# Patient Record
Sex: MALE | Race: WHITE | NOT HISPANIC OR LATINO | Employment: FULL TIME | URBAN - METROPOLITAN AREA
[De-identification: names, ages, dates, MRNs, and addresses within clinical notes are randomized per-mention and may not be internally consistent; named-entity substitution may affect disease eponyms.]

---

## 2017-09-01 ENCOUNTER — OFFICE VISIT (OUTPATIENT)
Dept: URGENT CARE | Facility: URGENT CARE | Age: 52
End: 2017-09-01
Payer: COMMERCIAL

## 2017-09-01 VITALS
TEMPERATURE: 98 F | DIASTOLIC BLOOD PRESSURE: 90 MMHG | HEART RATE: 78 BPM | RESPIRATION RATE: 15 BRPM | OXYGEN SATURATION: 97 % | SYSTOLIC BLOOD PRESSURE: 124 MMHG | WEIGHT: 220 LBS | HEIGHT: 76 IN | BODY MASS INDEX: 26.79 KG/M2

## 2017-09-01 DIAGNOSIS — I83.90 VARICOSE VEIN OF LEG: Primary | ICD-10-CM

## 2017-09-01 PROCEDURE — 99213 OFFICE O/P EST LOW 20 MIN: CPT | Performed by: FAMILY MEDICINE

## 2017-09-01 NOTE — MR AVS SNAPSHOT
"              After Visit Summary   2017    Gualberto Smith    MRN: [id number]           Patient Information     Date Of Birth          1965        Visit Information        Provider Department      2017 5:15 PM Andre Crawford MD [facility]        Today's Diagnoses     Varicose vein of leg    -  1       Follow-ups after your visit        Who to contact     If you have questions or need follow up information about today's clinic visit or your schedule please contact [facility] U[facility] directly at [phone].  Normal or non-critical lab and imaging results will be communicated to you by [employer], letter or phone within 4 business days after the clinic has received the results. If you do not hear from us within 7 days, please contact the clinic through [employer] or phone. If you have a critical or abnormal lab result, we will notify you by phone as soon as possible.  Submit refill requests through [employer] or call your pharmacy and they will forward the refill request to us. Please allow 3 business days for your refill to be completed.          Additional Information About Your Visit        MyChart Information     [employer] lets you send messages to your doctor, view your test results, renew your prescriptions, schedule appointments and more. To sign up, go to [email or web address] . Click on \"Log in\" on the left side of the screen, which will take you to the Welcome page. Then click on \"Sign up Now\" on the right side of the page.     You will be asked to enter the access code listed below, as well as some personal information. Please follow the directions to create your username and password.     Your access code is: [email or web address]  Expires: 2017  5:32 PM     Your access code will  in 90 days. If you need help or a new code, please call your [facility] clinic or [phone].        Care EveryWhere ID     This is your Care EveryWhere ID. This could be used " "by other organizations to access your [facility] medical records  [id number]        Your Vitals Were     Pulse Temperature Respirations Height Pulse Oximetry BMI (Body Mass Index)    78 98  F (36.7  C) (Oral) 15 6' 4\" (1.93 m) 97% 26.78 kg/m2       Blood Pressure from Last 3 Encounters:   09/01/17 124/90   08/12/16 112/66   07/23/15 (!) 135/96    Weight from Last 3 Encounters:   09/01/17 220 lb (99.8 kg)   08/12/16 218 lb 4 oz (99 kg)   07/23/15 209 lb 1.6 oz (94.8 kg)              Today, you had the following     No orders found for display         Today's Medication Changes          These changes are accurate as of: 9/1/17  5:32 PM.  If you have any questions, ask your nurse or doctor.               Stop taking these medicines if you haven't already. Please contact your care team if you have questions.     azithromycin 250 MG tablet   Commonly known as:  ZITHROMAX   Stopped by:  Andre Crawford MD           guaiFENesin-codeine 100-10 MG/5ML Soln solution   Commonly known as:  ROBITUSSIN AC   Stopped by:  Andre Crawford MD                    Primary Care Provider Office Phone # Fax #    Arash Salgado René Ness [phone] [address] [facility] [address]        Equal Access to Services     [facility] ENRRIQUE AH: Hadii miesha ku hadasho Soomaali, waaxda luqadaha, qaybta kaalmada wendie rangel. So [facility] [phone].    ATENCIÓN: Si habla español, tiene a doss disposición servicios gratuitos de asistencia lingüística. Jeanie al [phone].    We comply with applicable federal civil rights laws and [place] laws. We do not discriminate on the basis of race, color, national origin, age, disability sex, sexual orientation or gender identity.            Thank you!     Thank you for choosing [facility] URGENT CARE  for your care. Our goal is always to provide you with excellent care. Hearing back from our patients is one way we can continue to " improve our services. Please take a few minutes to complete the written survey that you may receive in the mail after your visit with us. Thank you!             Your Updated Medication List - Protect others around you: Learn how to safely use, store and throw away your medicines at www.disposemymeds.org.          This list is accurate as of: 9/1/17  5:32 PM.  Always use your most recent med list.                   Brand Name Dispense Instructions for use Diagnosis    acetaminophen 650 MG 8 hour tablet     100 tablet    Take 650 mg by mouth every 4 hours as needed for mild pain    Acute pharyngitis       acyclovir 400 MG tablet    ZOVIRAX    15 tablet    Take 1 tablet (400 mg) by mouth 3 times daily    Recurrent cold sores       albuterol 108 (90 BASE) MCG/ACT Inhaler    PROAIR HFA/PROVENTIL HFA/VENTOLIN HFA    1 Inhaler    Inhale 2 puffs into the lungs every 6 hours as needed for shortness of breath / dyspnea or wheezing    Acute bronchitis       Multi-vitamin Tabs tablet     100 tablet    Take 1 tablet by mouth daily        phenol-menthol 14.5 MG lozenge      Place 1 lozenge inside cheek every 2 hours as needed for moderate pain        VIAGRA 100 MG cap/tab   Generic drug:  sildenafil     12 tablet    Take 1 tablet (100 mg) by mouth daily as needed for erectile dysfunction HOLD UNTIL YOU ARE SEEN BY YOUR PRIMARY DOCTOR.    ED (erectile dysfunction)

## 2017-09-01 NOTE — PROGRESS NOTES
Subjective: Patient has had varicose veins in his legs for a long time and occasionally one will get a little sore and then pop out and it feels like that may be happening in his right upper calf posteriorly but he has been on a plane a few times in the last few weeks and got worried. He has never had deep venous thrombosis.    Objective: He has a 2 x 4 cm area that is just slightly swollen and slightly red but quite near the surface, no obvious skin abnormalities, not in apparent typical for a vein. He has lots of typical varicose veins through both sides of the legs.    Assessment and plan: Superficial irritation that may be related to varicose veins but certainly isn't a DVT because it is so close to the surface. He'll hot pack it and observe for a while and if it gets worse he should be seen in the emergency room since we couldn't do an ultrasound here.

## 2017-09-01 NOTE — NURSING NOTE
"Chief Complaint   Patient presents with     Urgent Care     Pain     right calf soreness but pain is at surface, some redness. Pt has been flying a lot lately. has had this before.        Initial /90  Pulse 78  Temp 98  F (36.7  C) (Oral)  Resp 15  Ht 6' 4\" (1.93 m)  Wt 220 lb (99.8 kg)  SpO2 97%  BMI 26.78 kg/m2 Estimated body mass index is 26.78 kg/(m^2) as calculated from the following:    Height as of this encounter: 6' 4\" (1.93 m).    Weight as of this encounter: 220 lb (99.8 kg).  Medication Reconciliation: complete  "

## 2017-09-05 ENCOUNTER — TELEPHONE (OUTPATIENT)
Dept: FAMILY MEDICINE | Facility: CLINIC | Age: 52
End: 2017-09-05

## 2017-09-05 DIAGNOSIS — M79.661 RIGHT CALF PAIN: Primary | ICD-10-CM

## 2017-09-05 NOTE — TELEPHONE ENCOUNTER
Spoke with patient and reviewed normal ultrasound results.    Arash Ness MD  Family Medicine Physician

## 2017-09-05 NOTE — TELEPHONE ENCOUNTER
Pretty, Technologist from [facility],who performed US stated patient results for DVT negative. Patient does have a tiny thrombus in a superficial varicose vein that seems to be causing the patient redness and discomfort at the noted area. Patient sent home.  If any questions the number to speak to Technologist [phone].    Please advise on any instruction for the patient .     Thanks! Bell Campbell RN

## 2017-09-05 NOTE — TELEPHONE ENCOUNTER
LVM instructing patient to call clinic back to inform which leg patient is having pain so US can be ordered.     Thanks! Bell Campbell RN

## 2017-09-05 NOTE — TELEPHONE ENCOUNTER
Rt leg pain with visibly red about a quarter size on calf. Patient requesting US for right calf. Arash Ness MD in agreement per note. Order placed and patient updated to appt. At [address] [place] [address] at 2:30 pm. Read and call requested so Arash Ness MD can advise patient.     Thanks! Bell Campbell RN

## 2017-09-05 NOTE — TELEPHONE ENCOUNTER
[facility] note looks like they aren't concerned about DVT, but rather superficial phlebitis. Not clear which leg. I am happy to see him to discuss. If he really feels nothing has changed since last visit, could order outpatient ultrasound, but should be done by early afternoon so results can be discussed/called to me. Would need to clarify if it is of one or both legs.     Alternatively could refer him to vein clinic/vascular if this is more of an ongoing issue.    Arash Ness MD  Family Medicine Physician

## 2017-09-05 NOTE — TELEPHONE ENCOUNTER
Reason for Call: Request for an order or referral:    Order or referral being requested: US of leg    Date needed: as soon as possible    Has the patient been seen by the PCP for this problem? NO    Additional comments: Pt was seen at  on 9/1. Pt still has the same symptoms and is requesting an US be ordered.    Phone number Patient can be reached at: [phone]    Best Time:  Anytime    Can we leave a detailed message on this number?  YES    Call taken on 9/5/2017 at 9:53 AM by Sondra Comer

## 2017-11-03 ENCOUNTER — TELEPHONE (OUTPATIENT)
Dept: FAMILY MEDICINE | Facility: CLINIC | Age: 52
End: 2017-11-03

## 2017-11-07 ENCOUNTER — TRANSFERRED RECORDS (OUTPATIENT)
Dept: HEALTH INFORMATION MANAGEMENT | Facility: CLINIC | Age: 52
End: 2017-11-07

## 2018-01-05 ENCOUNTER — TELEPHONE (OUTPATIENT)
Dept: FAMILY MEDICINE | Facility: CLINIC | Age: 53
End: 2018-01-05

## 2018-01-05 NOTE — TELEPHONE ENCOUNTER
Reason for Call: Request for an order or referral:    Order or referral being requested: ORDER    Date needed: as soon as possible    Has the patient been seen by the PCP for this problem? NO    Additional comments: Pt had imaging done at [facility] who told pt he had a hernia. Pt needs Order from Dr René Ness to see a surgeon or Physical Therapist.      Phone number Patient can be reached at:  Home number on file [phone] (home)    Best Time:  Any time    Can we leave a detailed message on this number?  YES    Call taken on 1/5/2018 at 11:05 AM by Mali Veliz

## 2018-01-05 NOTE — TELEPHONE ENCOUNTER
Attempted to call pt but the mailbox is full.   No my chart  Pt hasn't had an office visit since 8/16  Nelly Tolbert RN

## 2018-01-05 NOTE — TELEPHONE ENCOUNTER
Unclear what type of hernia he is referring to. Herniated disc? Abdominal or inguinal hernia? Insurance rarely requires referral but could offer number perhaps with dr order pending clarification. May be helpful to request records from [facility] [facility].    Arash Ness MD  Family Medicine Physician

## 2018-01-09 NOTE — TELEPHONE ENCOUNTER
Gualberto called back saying he will have the [facility] fax over the imaging results, he said there was 3 spots.

## 2018-01-23 NOTE — TELEPHONE ENCOUNTER
SOREN do you remember seeing a fax from [facility] regarding this patient?  The message is from 2 weeks ago.     I guess we can close encounter if we have not heard as your note says below. Thought I would check with you first.   Venita Grewal RN

## 2018-01-24 NOTE — TELEPHONE ENCOUNTER
No I have not yet received a fax from [facility] about this.    Arash Ness MD  Family Medicine Physician

## 2018-03-02 ENCOUNTER — OFFICE VISIT (OUTPATIENT)
Dept: FAMILY MEDICINE | Facility: CLINIC | Age: 53
End: 2018-03-02
Payer: COMMERCIAL

## 2018-03-02 VITALS
HEIGHT: 76 IN | BODY MASS INDEX: 27.38 KG/M2 | WEIGHT: 224.8 LBS | HEART RATE: 67 BPM | OXYGEN SATURATION: 95 % | DIASTOLIC BLOOD PRESSURE: 86 MMHG | SYSTOLIC BLOOD PRESSURE: 134 MMHG

## 2018-03-02 DIAGNOSIS — S39.012A STRAIN OF LUMBAR REGION, INITIAL ENCOUNTER: ICD-10-CM

## 2018-03-02 DIAGNOSIS — M62.08 DIASTASIS RECTI: Primary | ICD-10-CM

## 2018-03-02 DIAGNOSIS — Z11.59 NEED FOR HEPATITIS C SCREENING TEST: ICD-10-CM

## 2018-03-02 DIAGNOSIS — Z23 NEED FOR PROPHYLACTIC VACCINATION WITH TETANUS-DIPHTHERIA (TD): ICD-10-CM

## 2018-03-02 DIAGNOSIS — K42.9 UMBILICAL HERNIA WITHOUT OBSTRUCTION AND WITHOUT GANGRENE: ICD-10-CM

## 2018-03-02 DIAGNOSIS — Z23 IMMUNIZATION DUE: ICD-10-CM

## 2018-03-02 DIAGNOSIS — Z12.11 SCREEN FOR COLON CANCER: ICD-10-CM

## 2018-03-02 DIAGNOSIS — B00.1 RECURRENT COLD SORES: ICD-10-CM

## 2018-03-02 PROCEDURE — 99214 OFFICE O/P EST MOD 30 MIN: CPT | Mod: 25 | Performed by: FAMILY MEDICINE

## 2018-03-02 PROCEDURE — 90715 TDAP VACCINE 7 YRS/> IM: CPT | Performed by: FAMILY MEDICINE

## 2018-03-02 PROCEDURE — 90471 IMMUNIZATION ADMIN: CPT | Performed by: FAMILY MEDICINE

## 2018-03-02 RX ORDER — ACYCLOVIR 400 MG/1
400 TABLET ORAL 3 TIMES DAILY
Qty: 15 TABLET | Refills: 3 | Status: SHIPPED | OUTPATIENT
Start: 2018-03-02 | End: 2019-05-13

## 2018-03-02 NOTE — NURSING NOTE
"Chief Complaint   Patient presents with     RECHECK       Initial /86 (BP Location: Left arm, Patient Position: Chair, Cuff Size: Adult Regular)  Pulse 67  Ht 6' 4\" (1.93 m)  Wt 224 lb 12.8 oz (102 kg)  SpO2 95%  BMI 27.36 kg/m2 Estimated body mass index is 27.36 kg/(m^2) as calculated from the following:    Height as of this encounter: 6' 4\" (1.93 m).    Weight as of this encounter: 224 lb 12.8 oz (102 kg).  Medication Reconciliation: complete     Martha Reeves MA     "

## 2018-03-02 NOTE — MR AVS SNAPSHOT
After Visit Summary   3/2/2018    Gualberto Smith    MRN: [id number]           Patient Information     Date Of Birth          1965        Visit Information        Provider Department      3/2/2018 8:30 AM Arash Calvo MD [facility]        Today's Diagnoses     Diastasis recti    -  1    Screen for colon cancer        Need for hepatitis C screening test        Need for prophylactic vaccination with tetanus-diphtheria (TD)        Recurrent cold sores        Umbilical hernia without obstruction and without gangrene        Strain of lumbar region, initial encounter          Care Instructions    Will benefit from core strengthening.  I am recommending a consult with general surgery to discuss questions about hernia that were raised at your executive physical.  Unclear if there may be a partial inguinal hernia as well.           Follow-ups after your visit        Additional Services     GENERAL SURG ADULT REFERRAL       Your provider has referred you to: [facility]: General Surgery Clinic [facility] [phone]   [email or web address]    Unclear if there may be a partial inguinal hernia as well. Patient looking for information as to if and when surgery would be beneficial but is not eager to have surgical repair, especially if it would not help.    Please be aware that coverage of these services is subject to the terms and limitations of your health insurance plan.  Call member services at your health plan with any benefit or coverage questions.      Please bring the following with you to your appointment:    (1) Any X-Rays, CTs or MRIs which have been performed.  Contact the facility where they were done to arrange for  prior to your scheduled appointment.   (2) List of current medications   (3) This referral request   (4) Any documents/labs given to you for this referral            [facility] PT, HAND, AND CHIROPRACTIC REFERRAL        **This order will print in the [facility] Scheduling Office**    Physical Therapy, Hand Therapy and Chiropractic Care are available through:    *[facility]  *[facility]  *[facility]    Call one number to schedule at any of the above locations: [phone].    Your provider has referred you to: Physical Therapy at [facility] or [facility]    Indication/Reason for Referral: Low Back Pain  Onset of Illness: ongoing  Therapy Orders: Evaluate and Treat  Special Programs: None  Special Request: Equipment: As Indicated: per eval  Exercise: Active/Assistive ROM, Conditioning, Passive ROM, Progressive Strengthening and Stretching/Flexibility  Manual Therapy: Joint Mobilization and Myofascial Release/Massage  None    Candy Gupta      Additional Comments for the Therapist or Chiropractor: Patient will benefit from hamstring stretching, iliopsoas stretching and strengthening of these and other core back muscles.    Please be aware that coverage of these services is subject to the terms and limitations of your health insurance plan.  Call member services at your health plan with any benefit or coverage questions.      Please bring the following to your appointment:    *Your personal calendar for scheduling future appointments  *Comfortable clothing                  Who to contact     If you have questions or need follow up information about today's clinic visit or your schedule please contact [facility] directly at [phone].  Normal or non-critical lab and imaging results will be communicated to you by MyChart, letter or phone within 4 business days after the clinic has received the results. If you do not hear from us within 7 days, please contact the clinic through MyChart or phone. If you have a critical or abnormal lab result, we will notify you by phone as soon as possible.  Submit refill requests through [employer] or call your pharmacy and they will forward  "the refill request to us. Please allow 3 business days for your refill to be completed.          Additional Information About Your Visit        MyChart Information     [employer] lets you send messages to your doctor, view your test results, renew your prescriptions, schedule appointments and more. To sign up, go to [email or web address] . Click on \"Log in\" on the left side of the screen, which will take you to the Welcome page. Then click on \"Sign up Now\" on the right side of the page.     You will be asked to enter the access code listed below, as well as some personal information. Please follow the directions to create your username and password.     Your access code is: [email or web address]  Expires: 2018  9:10 AM     Your access code will  in 90 days. If you need help or a new code, please call your [facility] clinic or [phone].        Care EveryWhere ID     This is your Care EveryWhere ID. This could be used by other organizations to access your [facility] medical records  [id number]        Your Vitals Were     Pulse Height Pulse Oximetry BMI (Body Mass Index)          67 6' 4\" (1.93 m) 95% 27.36 kg/m2         Blood Pressure from Last 3 Encounters:   18 134/86   17 124/90   16 112/66    Weight from Last 3 Encounters:   18 224 lb 12.8 oz (102 kg)   17 220 lb (99.8 kg)   16 218 lb 4 oz (99 kg)              We Performed the Following     GENERAL SURG ADULT REFERRAL     [facility] PT, HAND, AND CHIROPRACTIC REFERRAL          Where to get your medicines      These medications were sent to [id number] - [place], [place] - [address] AT [facility] [address] [place] [address]    Hours:  24-hours Phone:  [phone]     acyclovir 400 MG tablet          Primary Care Provider Office Phone # Fax #    Arash Damian Ness [phone]       [address]  [facility] [address]        Equal Access to Services     " [facility] HospitalMICHAEL : Hadii aad ku clement Del Cid, waaxda luqadaha, qaybta kaalmada adeyomaira, wendie levi kobyernestina wilhelm oswaldoale piedra . So [facility] [phone].    ATENCIÓN: Si habla español, tiene a doss disposición servicios gratuitos de asistencia lingüística. Llame al [phone].    We comply with applicable federal civil rights laws and [place] laws. We do not discriminate on the basis of race, color, national origin, age, disability, sex, sexual orientation, or gender identity.            Thank you!     Thank you for choosing [facility]  for your care. Our goal is always to provide you with excellent care. Hearing back from our patients is one way we can continue to improve our services. Please take a few minutes to complete the written survey that you may receive in the mail after your visit with us. Thank you!             Your Updated Medication List - Protect others around you: Learn how to safely use, store and throw away your medicines at www.disposemymeds.org.          This list is accurate as of 3/2/18  9:10 AM.  Always use your most recent med list.                   Brand Name Dispense Instructions for use Diagnosis    acetaminophen 650 MG 8 hour tablet     100 tablet    Take 650 mg by mouth every 4 hours as needed for mild pain    Acute pharyngitis       acyclovir 400 MG tablet    ZOVIRAX    15 tablet    Take 1 tablet (400 mg) by mouth 3 times daily    Recurrent cold sores       albuterol 108 (90 BASE) MCG/ACT Inhaler    PROAIR HFA/PROVENTIL HFA/VENTOLIN HFA    1 Inhaler    Inhale 2 puffs into the lungs every 6 hours as needed for shortness of breath / dyspnea or wheezing    Acute bronchitis       Multi-vitamin Tabs tablet     100 tablet    Take 1 tablet by mouth daily        VIAGRA 100 MG tablet   Generic drug:  sildenafil     12 tablet    Take 1 tablet (100 mg) by mouth daily as needed for erectile dysfunction HOLD UNTIL YOU ARE SEEN BY YOUR PRIMARY DOCTOR.    ED (erectile  dysfunction)

## 2018-03-02 NOTE — PROGRESS NOTES
"  SUBJECTIVE:   Gualberto Smith is a 52 year old male who presents to clinic today for the following health issues:    Recheck : From 11/2017  - MRI at [facility]     He had an executive physical exam last fall at [facility]. They noted a hernia on ultrasound and he wanted to follow-up on this. He hasn't yet had a basic consult on whether or not he needs surgery, discussion of what the perioperative course would entail and how to prepare. If he does need surgery he wants to be sure he does what he can to strengthen his back prior to surgery.    He noticed having a hernia last spring.    ROS  Does have pain in between the shoulder blades  Does have some low back discomfort  No diarrhea  No constipation  Some intermittent abdominal pain in both inguinal areas.    Would like a refill for acyclovir for rare flare ups of HSV lesion on the lower lip.    Had colonoscopy last spring at [place] [facility] that was clear.    EXAM:  /86 (BP Location: Left arm, Patient Position: Chair, Cuff Size: Adult Regular)  Pulse 67  Ht 6' 4\" (1.93 m)  Wt 224 lb 12.8 oz (102 kg)  SpO2 95%  BMI 27.36 kg/m2  Constitutional: Healthy, alert, no distress   EENT:   Cardiovascular: RRR. No murmurs   Respiratory: Clear to auscultation   Gastrointestinal: Diastesis recti noted, minimal umbilical hernia, no discomfort with palpation of abdomen  MSK; ambulates without difficulty, some difficulty sitting from a prone position without using arms.    ASSESSMENT    ICD-10-CM    1. Diastasis recti M62.08 GENERAL SURG ADULT REFERRAL   2. Umbilical hernia without obstruction and without gangrene K42.9 GENERAL SURG ADULT REFERRAL   3. Strain of lumbar region, initial encounter S39.012A [facility] PT, HAND, AND CHIROPRACTIC REFERRAL   4. Screen for colon cancer Z12.11    5. Need for hepatitis C screening test Z11.59    6. Need for prophylactic vaccination with tetanus-diphtheria (TD) Z23    7. Recurrent cold sores B00.1 acyclovir (ZOVIRAX) 400 MG tablet   8. Immunization due Z23 " TDAP VACCINE (ADACEL)     ADMIN 1st VACCINE      Plan:  Patient Instructions   Will benefit from core strengthening.  I am recommending a consult with general surgery to discuss questions about hernia that were raised at your executive physical.  Unclear if there may be a partial inguinal hernia as well.      Arash Ness MD  Family Medicine Physician

## 2018-03-02 NOTE — PATIENT INSTRUCTIONS
Will benefit from core strengthening.  I am recommending a consult with general surgery to discuss questions about hernia that were raised at your executive physical.  Unclear if there may be a partial inguinal hernia as well.

## 2018-04-16 ENCOUNTER — TRANSFERRED RECORDS (OUTPATIENT)
Dept: HEALTH INFORMATION MANAGEMENT | Facility: CLINIC | Age: 53
End: 2018-04-16

## 2019-05-13 DIAGNOSIS — B00.1 RECURRENT COLD SORES: ICD-10-CM

## 2019-05-13 NOTE — LETTER
[facility]  [address] [place] [address]  Phone: [phone]    05/15/19    Gualberto Smith  [address]  [place] [address]      To whom it may concern:     In order to ensure we are providing the best quality care, we have reviewed your chart and see that you are due for a annual physical.    For future medication refills, please contact your primary care clinic to schedule a annual physical appointment. This can be requested via [employer] or by calling the clinic at [phone].    We greatly appreciate the opportunity to serve you. Thank you for trusting us with your health care.      Sincerely,      Your care team at [facility]

## 2019-05-13 NOTE — TELEPHONE ENCOUNTER
"Requested Prescriptions   Pending Prescriptions Disp Refills     acyclovir (ZOVIRAX) 400 MG tablet [Pharmacy Med Name: ACYCLOVIR 400MG TABLETS]  Last Written Prescription Date:  3-2-18  Last Fill Quantity: 15 tab,  # refills: 3   Last office visit: 3/2/2018 with prescribing provider:  Claudia Cornell    Future Office Visit:    15 tablet 0     Sig: TAKE 1 TABLET BY MOUTH THREE TIMES DAILY       Antivirals for Herpes Protocol Failed - 5/13/2019  7:50 AM        Failed - Recent (12 mo) or future (30 days) visit within the authorizing provider's specialty     Patient had office visit in the last 12 months or has a visit in the next 30 days with authorizing provider or within the authorizing provider's specialty.  See \"Patient Info\" tab in inbasket, or \"Choose Columns\" in Meds & Orders section of the refill encounter.          Failed - Normal serum creatinine on file in past 12 months     Recent Labs   Lab Test 07/23/15  0205   CR 0.87           Passed - Patient is age 12 or older        Passed - Medication is active on med list         "

## 2019-05-15 RX ORDER — ACYCLOVIR 400 MG/1
TABLET ORAL
Qty: 15 TABLET | Refills: 0 | Status: SHIPPED | OUTPATIENT
Start: 2019-05-15 | End: 2019-10-16

## 2019-05-15 NOTE — TELEPHONE ENCOUNTER
Unable to LM as mailbox is full. Patient is due for annual physical.  Sending letter.       Sameera DORAN     [facility]

## 2019-05-15 NOTE — TELEPHONE ENCOUNTER
DOD:  Please review/sign or advise for refill request of: acyclovir (ZOVIRAX) 400 MG tablet     Routing refill request to provider for review/approval because:  Labs not current:  Creat (done 07/2015)  Patient needs to be seen because it has been more than 1 year since last office visit.    HP Reception: Please call patient, he is due for annual exam    Thank You!  Linda Bridges, RN  Triage Nurse

## 2019-10-16 DIAGNOSIS — B00.1 RECURRENT COLD SORES: ICD-10-CM

## 2019-10-17 NOTE — TELEPHONE ENCOUNTER
"Requested Prescriptions   Pending Prescriptions Disp Refills     acyclovir (ZOVIRAX) 400 MG tablet [Pharmacy Med Name: ACYCLOVIR 400MG TABLETS]  Last Written Prescription Date:  5-15-19  Last Fill Quantity: 15 tab,  # refills: 0   Last office visit: 3/2/2018 with prescribing provider:  Smooth Calvo    Future Office Visit:   15 tablet 0     Sig: TAKE 1 TABLET BY MOUTH THREE TIMES DAILY       Antivirals for Herpes Protocol Failed - 10/16/2019  9:05 PM        Failed - Recent (12 mo) or future (30 days) visit within the authorizing provider's specialty     Patient has had an office visit with the authorizing provider or a provider within the authorizing providers department within the previous 12 mos or has a future within next 30 days. See \"Patient Info\" tab in inbasket, or \"Choose Columns\" in Meds & Orders section of the refill encounter.              Failed - Normal serum creatinine on file in past 12 months     Recent Labs   Lab Test 07/23/15  0205   CR 0.87             Passed - Patient is age 12 or older        Passed - Medication is active on med list         "

## 2019-10-18 RX ORDER — ACYCLOVIR 400 MG/1
TABLET ORAL
Qty: 6 TABLET | Refills: 0 | Status: SHIPPED | OUTPATIENT
Start: 2019-10-18 | End: 2021-05-21

## 2019-11-24 ENCOUNTER — OFFICE VISIT (OUTPATIENT)
Dept: URGENT CARE | Facility: URGENT CARE | Age: 54
End: 2019-11-24
Payer: COMMERCIAL

## 2019-11-24 VITALS
WEIGHT: 225 LBS | HEART RATE: 74 BPM | SYSTOLIC BLOOD PRESSURE: 108 MMHG | RESPIRATION RATE: 15 BRPM | BODY MASS INDEX: 27.4 KG/M2 | TEMPERATURE: 98.1 F | HEIGHT: 76 IN | DIASTOLIC BLOOD PRESSURE: 80 MMHG

## 2019-11-24 DIAGNOSIS — J06.9 VIRAL URI WITH COUGH: Primary | ICD-10-CM

## 2019-11-24 PROCEDURE — 99213 OFFICE O/P EST LOW 20 MIN: CPT | Performed by: PHYSICIAN ASSISTANT

## 2019-11-24 RX ORDER — CODEINE PHOSPHATE AND GUAIFENESIN 10; 100 MG/5ML; MG/5ML
1-2 SOLUTION ORAL EVERY 4 HOURS PRN
Qty: 120 ML | Refills: 0 | Status: SHIPPED | OUTPATIENT
Start: 2019-11-24 | End: 2022-03-11

## 2019-11-24 ASSESSMENT — MIFFLIN-ST. JEOR: SCORE: 1957.09

## 2019-11-24 NOTE — PROGRESS NOTES
"SUBJECTIVE:   Gualberto Smith is a 54 year old male presenting with a chief complaint of cough and runny.  Onset of symptoms was 3 day(s) ago.  Course of illness is same.    Severity moderately severe  Current and Associated symptoms: cough and chest congestion  Treatment measures tried include OTC Cough med.  Predisposing factors include Patient was on a flight from [place].  Denies fever/chills, HA, CP, pleuritic chest pain, hemoptysis, SOB, abd pain, N/V/D, rash, or any other symptoms.    Past Medical History:   Diagnosis Date     NO ACTIVE PROBLEMS      Current Outpatient Medications   Medication Sig Dispense Refill     acetaminophen 650 MG TABS Take 650 mg by mouth every 4 hours as needed for mild pain 100 tablet      acyclovir (ZOVIRAX) 400 MG tablet TAKE 1 TABLET BY MOUTH THREE TIMES DAILY 6 tablet 0     albuterol (PROAIR HFA, PROVENTIL HFA, VENTOLIN HFA) 108 (90 BASE) MCG/ACT inhaler Inhale 2 puffs into the lungs every 6 hours as needed for shortness of breath / dyspnea or wheezing 1 Inhaler 0     multivitamin, therapeutic with minerals (MULTI-VITAMIN) TABS Take 1 tablet by mouth daily 100 tablet 3     sildenafil (VIAGRA) 100 MG tablet Take 1 tablet (100 mg) by mouth daily as needed for erectile dysfunction HOLD UNTIL YOU ARE SEEN BY YOUR PRIMARY DOCTOR. 12 tablet 3     Social History     Tobacco Use     Smoking status: Never Smoker     Smokeless tobacco: Never Used   Substance Use Topics     Alcohol use: Yes     Comment: 3 times per week       ROS:  Review of systems negative except as stated above.    OBJECTIVE:  /80   Pulse 74   Temp 98.1  F (36.7  C) (Oral)   Resp 15   Ht 1.93 m (6' 4\")   Wt 102.1 kg (225 lb)   BMI 27.39 kg/m    GENERAL APPEARANCE: healthy, alert and no distress  EYES: EOMI,  PERRL, conjunctiva clear  HENT: ear canals and TM's normal.  Nose and mouth without ulcers, erythema or lesions  NECK: supple, nontender, no lymphadenopathy  RESP: lungs clear to auscultation - no rales, " rhonchi or wheezes  CV: regular rates and rhythm, normal S1 S2, no murmur noted  Extremities: Lakshmi's sign is negative  NEURO: Normal strength and tone, sensory exam grossly normal,  normal speech and mentation  SKIN: no suspicious lesions or rashes    ASSESSMENT / PLAN:  1. Viral URI with cough  Encouraged fluids and rest  Humidified air night  No evidence of PE, pneumonia or wheezing  - guaiFENesin-codeine (ROBITUSSIN AC) 100-10 MG/5ML solution; Take 5-10 mLs by mouth every 4 hours as needed for cough  Dispense: 120 mL; Refill: 0    Diagnosis and treatment plan was reviewed with patient and/or family.   We went over any labs or imaging. Discussed worsening symptoms or little to no relief despite treatment plan to follow-up with PCP or return to clinic.  Patient verbalizes understanding. All questions were addressed and answered.   Katt Moya PA-C

## 2020-04-17 ENCOUNTER — NURSE TRIAGE (OUTPATIENT)
Dept: NURSING | Facility: CLINIC | Age: 55
End: 2020-04-17

## 2020-04-17 ENCOUNTER — OFFICE VISIT (OUTPATIENT)
Dept: URGENT CARE | Facility: URGENT CARE | Age: 55
End: 2020-04-17
Payer: COMMERCIAL

## 2020-04-17 VITALS
RESPIRATION RATE: 14 BRPM | WEIGHT: 225 LBS | DIASTOLIC BLOOD PRESSURE: 70 MMHG | TEMPERATURE: 97.9 F | SYSTOLIC BLOOD PRESSURE: 112 MMHG | HEART RATE: 70 BPM | BODY MASS INDEX: 27.4 KG/M2 | HEIGHT: 76 IN

## 2020-04-17 DIAGNOSIS — M25.572 PAIN IN JOINT, ANKLE AND FOOT, LEFT: Primary | ICD-10-CM

## 2020-04-17 LAB
BASOPHILS # BLD AUTO: 0 10E9/L (ref 0–0.2)
BASOPHILS NFR BLD AUTO: 0.3 %
D DIMER PPP FEU-MCNC: 0.3 UG/ML FEU (ref 0–0.5)
DIFFERENTIAL METHOD BLD: NORMAL
EOSINOPHIL # BLD AUTO: 0.1 10E9/L (ref 0–0.7)
EOSINOPHIL NFR BLD AUTO: 1.6 %
ERYTHROCYTE [DISTWIDTH] IN BLOOD BY AUTOMATED COUNT: 13.2 % (ref 10–15)
HCT VFR BLD AUTO: 46.3 % (ref 40–53)
HGB BLD-MCNC: 14.8 G/DL (ref 13.3–17.7)
LYMPHOCYTES # BLD AUTO: 2 10E9/L (ref 0.8–5.3)
LYMPHOCYTES NFR BLD AUTO: 29.6 %
MCH RBC QN AUTO: 27.1 PG (ref 26.5–33)
MCHC RBC AUTO-ENTMCNC: 32 G/DL (ref 31.5–36.5)
MCV RBC AUTO: 85 FL (ref 78–100)
MONOCYTES # BLD AUTO: 0.6 10E9/L (ref 0–1.3)
MONOCYTES NFR BLD AUTO: 8.4 %
NEUTROPHILS # BLD AUTO: 4.1 10E9/L (ref 1.6–8.3)
NEUTROPHILS NFR BLD AUTO: 60.1 %
PLATELET # BLD AUTO: 152 10E9/L (ref 150–450)
RBC # BLD AUTO: 5.46 10E12/L (ref 4.4–5.9)
WBC # BLD AUTO: 6.8 10E9/L (ref 4–11)

## 2020-04-17 PROCEDURE — 85379 FIBRIN DEGRADATION QUANT: CPT | Performed by: PHYSICIAN ASSISTANT

## 2020-04-17 PROCEDURE — 36415 COLL VENOUS BLD VENIPUNCTURE: CPT | Performed by: PHYSICIAN ASSISTANT

## 2020-04-17 PROCEDURE — 85025 COMPLETE CBC W/AUTO DIFF WBC: CPT | Performed by: PHYSICIAN ASSISTANT

## 2020-04-17 PROCEDURE — 99215 OFFICE O/P EST HI 40 MIN: CPT | Performed by: PHYSICIAN ASSISTANT

## 2020-04-17 ASSESSMENT — MIFFLIN-ST. JEOR: SCORE: 1952.09

## 2020-04-17 NOTE — PROGRESS NOTES
SUBJECTIVE:   Gualberto Smith is a 55 year old male presenting with a chief complaint of   Chief Complaint   Patient presents with     Urgent Care     Musculoskeletal Problem     left ankle pain started about 1 hour ago, swelling viens inner ankle. Pain comes and goesr     Patient states that his pain is sharp and transient and is located over his left medial ankle. He is not having pain currently and he can't identify any aggravating factors. He says that the pain flared up once while he was sitting down. He denies any warmth or injury to the ankle. Patient states that he has been sitting for long periods of time and was concerned for a blood clot.     He is an established patient of [facility].    Review of Systems  Review Of Systems  Skin: negative  Eyes: negative  Ears/Nose/Throat: negative  Respiratory: No shortness of breath, dyspnea on exertion, cough, or hemoptysis  Cardiovascular: negative  Gastrointestinal: negative  Genitourinary: negative  Musculoskeletal: negative  Neurologic: negative  Psychiatric: negative  Hematologic/Lymphatic/Immunologic: negative  Endocrine: negative    Past Medical History:   Diagnosis Date     NO ACTIVE PROBLEMS      Family History   Problem Relation Age of Onset     Cancer Mother         lung,brain     Cerebrovascular Disease Father         70's     Hypertension Father      Respiratory Father      Heart Disease Maternal Grandfather      Cancer Sister         two of his sisters diagnosed with skin cancer-melanoma     Current Outpatient Medications   Medication Sig Dispense Refill     acetaminophen 650 MG TABS Take 650 mg by mouth every 4 hours as needed for mild pain 100 tablet      acyclovir (ZOVIRAX) 400 MG tablet TAKE 1 TABLET BY MOUTH THREE TIMES DAILY 6 tablet 0     albuterol (PROAIR HFA, PROVENTIL HFA, VENTOLIN HFA) 108 (90 BASE) MCG/ACT inhaler Inhale 2 puffs into the lungs every 6 hours as needed for shortness of breath / dyspnea or wheezing 1 Inhaler 0      "guaiFENesin-codeine (ROBITUSSIN AC) 100-10 MG/5ML solution Take 5-10 mLs by mouth every 4 hours as needed for cough 120 mL 0     multivitamin, therapeutic with minerals (MULTI-VITAMIN) TABS Take 1 tablet by mouth daily 100 tablet 3     sildenafil (VIAGRA) 100 MG tablet Take 1 tablet (100 mg) by mouth daily as needed for erectile dysfunction HOLD UNTIL YOU ARE SEEN BY YOUR PRIMARY DOCTOR. 12 tablet 3     Social History     Tobacco Use     Smoking status: Never Smoker     Smokeless tobacco: Never Used   Substance Use Topics     Alcohol use: Yes     Comment: 3 times per week       OBJECTIVE  /70   Pulse 70   Temp 97.9  F (36.6  C) (Oral)   Resp 14   Ht 1.93 m (6' 4\")   Wt 102.1 kg (225 lb)   BMI 27.39 kg/m      Physical Exam    Exam:  Constitutional: healthy, alert and no distress  Cardiovascular: negative, PMI normal. No lifts, heaves, or thrills. RRR. No murmurs, clicks gallops or rub  Respiratory: negative, Percussion normal. Good diaphragmatic excursion. Lungs clear  Musculoskeletal: extremities normal- no gross deformities noted, gait normal and normal muscle tone. Patient does not have increased pain with flexion or extension of his ankle. The medial malleolus is mildly tender and there is a mild protrusion of a vein over the malleolar surface.   Skin: Patient has multiple varicose veins on his bilateral lower extremities.   Neurologic: Gait normal. Reflexes normal and symmetric. Sensation grossly WNL.  Psychiatric: mentation appears normal and affect normal/bright  Hematologic/Lymphatic/Immunologic: Normal cervical lymph nodes      ASSESSMENT/PLAN:    (M25.572) Pain in joint, ankle and foot, left  (primary encounter diagnosis)  Comment: I would like to attempt to rule out DVT. Patient will proceed to the ED for ultrasound if results come back positive.  Plan: D dimer, quantitative, CBC with platelets and         differential    Patient was advised to return to clinic if symptoms do not improve in the " amount of time specified in the AVS or if symptoms worsen. Patient educated on red flag symptoms and asked to go directly to the ED if symptoms present themselves.     Gualberto Tanner PA-C on 4/17/2020 at 5:51 PM

## 2020-04-17 NOTE — TELEPHONE ENCOUNTER
Patient calling - says about a half an hour ago he was getting ready to go on walk.  Had sharp pain in left ankle.  It was very sharp pain.  Sharp pain lasted 10 seconds and then subsided.  Then pain came back.  Foot is a little swollen.  Is able to walk but has a bit of limp.  No pain right now.    No difficulty breathing.  No chest pain.  No fever.  Foot is not cool or blue in comparison to other foot.  No red area or red streak.  No open sores.    Triaged to disposition of See Physician Within 3 Days.  Advised patient to go to [email or web address] to set up virtual visit per current nurse triage protocol.  Care advice given per protocol.      Advised patient to call back if symptoms worsen.    Claudine Whitfield RN  Triage Nurse Advisor      COVID 19 Nurse Triage Plan/Patient Instructions    Please be aware that novel coronavirus (COVID-19) may be circulating in the community. If you develop symptoms such as fever, cough, or SOB or if you have concerns about the presence of another infection including coronavirus (COVID-19), please contact your health care provider or visit [email or web address].     Disposition/Instructions    Patient to have an [facility] Visit with a provider (Preferred option). Follow System Ambulatory Workflow for COVID 19.     To do this follow these instructions:    1. Go to the website [email or web address]  2. Create an account (you will need your insurance information)  3. Start a new visit  4. Choose your diagnosis (e.g. COVID19)  5. Fill out the information about your symptoms  6. A provider will reach out to you by text, phone call or video visit based on your request    Call Back If: Your symptoms worsen before you are able to complete your [facility] Visit with a provider.    Thank you for limiting contact with others, wearing a simple mask to cover your cough, practice good hand hygiene habits and accessing our virtual services where possible to limit the spread of this virus.    For more information about  COVID19 and options for caring for yourself at home, please visit the CDC website at https://www.cdc.gov/coronavirus/2019-ncov/about/steps-when-sick.html  For more options for care at [facility], please visit our website at [email or web address]    For more information, please use the [place] Department of Health ([facility]) COVID-19 Hotlines (Interpreters available):     Health questions: Phone Number: [phone] or [phone] and Hours: 7 a.m. to 7 p.m.    Schools and  questions: Phone Number: [phone] or [phone] and Hours 7 a.m. to 7 p.m.    Reason for Disposition    [1] Swollen joint AND [2] no fever or redness    Additional Information    Negative: Followed an ankle injury    Negative: Foot pain is main symptom    Negative: Thigh or calf pain is main symptom    Negative: Thigh or calf swelling is main symptom    Negative: Entire foot is cool or blue in comparison to other side    Negative: [1] Swollen joint AND [2] fever    Negative: [1] Red area or streak AND [2] fever    Negative: Patient sounds very sick or weak to the triager    Negative: [1] SEVERE pain (e.g., excruciating, unable to walk) AND [2] not improved after 2 hours of pain medicine    Negative: [1] Thigh or calf pain AND [2] only 1 side AND [3] present > 1 hour    Negative: [1] Calf swelling AND [2] only 1 side    Negative: [1] Looks infected (spreading redness, pus) AND [2] large red area (> 2 in. or 5 cm)    Negative: Looks like a boil, infected sore, or deep ulcer    Negative: [1] Redness of the skin AND [2] no fever    Negative: [1] Very swollen joint AND [2] no fever    Protocols used: ANKLE PAIN-A-AH

## 2021-04-06 ENCOUNTER — TRANSFERRED RECORDS (OUTPATIENT)
Dept: HEALTH INFORMATION MANAGEMENT | Facility: CLINIC | Age: 56
End: 2021-04-06

## 2021-05-21 ENCOUNTER — OFFICE VISIT (OUTPATIENT)
Dept: FAMILY MEDICINE | Facility: CLINIC | Age: 56
End: 2021-05-21
Payer: COMMERCIAL

## 2021-05-21 VITALS
SYSTOLIC BLOOD PRESSURE: 118 MMHG | DIASTOLIC BLOOD PRESSURE: 70 MMHG | OXYGEN SATURATION: 98 % | BODY MASS INDEX: 29.34 KG/M2 | WEIGHT: 236 LBS | HEART RATE: 81 BPM | RESPIRATION RATE: 16 BRPM | HEIGHT: 75 IN | TEMPERATURE: 98.7 F

## 2021-05-21 DIAGNOSIS — N52.9 ERECTILE DYSFUNCTION, UNSPECIFIED ERECTILE DYSFUNCTION TYPE: ICD-10-CM

## 2021-05-21 DIAGNOSIS — B00.1 RECURRENT COLD SORES: ICD-10-CM

## 2021-05-21 DIAGNOSIS — K43.9 VENTRAL HERNIA WITHOUT OBSTRUCTION OR GANGRENE: Primary | ICD-10-CM

## 2021-05-21 DIAGNOSIS — Z13.220 SCREENING FOR LIPID DISORDERS: ICD-10-CM

## 2021-05-21 DIAGNOSIS — Z13.1 SCREENING FOR DIABETES MELLITUS: ICD-10-CM

## 2021-05-21 DIAGNOSIS — Z13.220 SCREENING FOR HYPERLIPIDEMIA: ICD-10-CM

## 2021-05-21 DIAGNOSIS — Z12.5 SCREENING FOR PROSTATE CANCER: ICD-10-CM

## 2021-05-21 DIAGNOSIS — Z12.11 SCREEN FOR COLON CANCER: ICD-10-CM

## 2021-05-21 DIAGNOSIS — Z00.00 ROUTINE GENERAL MEDICAL EXAMINATION AT A HEALTH CARE FACILITY: ICD-10-CM

## 2021-05-21 DIAGNOSIS — Z11.59 NEED FOR HEPATITIS C SCREENING TEST: ICD-10-CM

## 2021-05-21 LAB
ALBUMIN SERPL-MCNC: 3.7 G/DL (ref 3.4–5)
ALP SERPL-CCNC: 63 U/L (ref 40–150)
ALT SERPL W P-5'-P-CCNC: 52 U/L (ref 0–70)
ANION GAP SERPL CALCULATED.3IONS-SCNC: 5 MMOL/L (ref 3–14)
AST SERPL W P-5'-P-CCNC: 24 U/L (ref 0–45)
BILIRUB SERPL-MCNC: 0.4 MG/DL (ref 0.2–1.3)
BUN SERPL-MCNC: 17 MG/DL (ref 7–30)
CALCIUM SERPL-MCNC: 9.4 MG/DL (ref 8.5–10.1)
CHLORIDE SERPL-SCNC: 107 MMOL/L (ref 94–109)
CHOLEST SERPL-MCNC: 217 MG/DL
CO2 SERPL-SCNC: 28 MMOL/L (ref 20–32)
CREAT SERPL-MCNC: 1.09 MG/DL (ref 0.66–1.25)
ERYTHROCYTE [DISTWIDTH] IN BLOOD BY AUTOMATED COUNT: 12.8 % (ref 10–15)
GFR SERPL CREATININE-BSD FRML MDRD: 75 ML/MIN/{1.73_M2}
GLUCOSE SERPL-MCNC: 97 MG/DL (ref 70–99)
HBA1C MFR BLD: 5.6 % (ref 0–5.6)
HCT VFR BLD AUTO: 44 % (ref 40–53)
HDLC SERPL-MCNC: 44 MG/DL
HGB BLD-MCNC: 14.4 G/DL (ref 13.3–17.7)
LDLC SERPL CALC-MCNC: 144 MG/DL
MCH RBC QN AUTO: 27.1 PG (ref 26.5–33)
MCHC RBC AUTO-ENTMCNC: 32.7 G/DL (ref 31.5–36.5)
MCV RBC AUTO: 83 FL (ref 78–100)
NONHDLC SERPL-MCNC: 173 MG/DL
PLATELET # BLD AUTO: 167 10E9/L (ref 150–450)
POTASSIUM SERPL-SCNC: 4.2 MMOL/L (ref 3.4–5.3)
PROT SERPL-MCNC: 7 G/DL (ref 6.8–8.8)
RBC # BLD AUTO: 5.32 10E12/L (ref 4.4–5.9)
SODIUM SERPL-SCNC: 140 MMOL/L (ref 133–144)
TRIGL SERPL-MCNC: 144 MG/DL
WBC # BLD AUTO: 6.3 10E9/L (ref 4–11)

## 2021-05-21 PROCEDURE — 80061 LIPID PANEL: CPT | Performed by: STUDENT IN AN ORGANIZED HEALTH CARE EDUCATION/TRAINING PROGRAM

## 2021-05-21 PROCEDURE — 86803 HEPATITIS C AB TEST: CPT | Performed by: STUDENT IN AN ORGANIZED HEALTH CARE EDUCATION/TRAINING PROGRAM

## 2021-05-21 PROCEDURE — 99213 OFFICE O/P EST LOW 20 MIN: CPT | Mod: 25 | Performed by: STUDENT IN AN ORGANIZED HEALTH CARE EDUCATION/TRAINING PROGRAM

## 2021-05-21 PROCEDURE — 36415 COLL VENOUS BLD VENIPUNCTURE: CPT | Performed by: STUDENT IN AN ORGANIZED HEALTH CARE EDUCATION/TRAINING PROGRAM

## 2021-05-21 PROCEDURE — 83036 HEMOGLOBIN GLYCOSYLATED A1C: CPT | Performed by: STUDENT IN AN ORGANIZED HEALTH CARE EDUCATION/TRAINING PROGRAM

## 2021-05-21 PROCEDURE — G0103 PSA SCREENING: HCPCS | Performed by: STUDENT IN AN ORGANIZED HEALTH CARE EDUCATION/TRAINING PROGRAM

## 2021-05-21 PROCEDURE — 99396 PREV VISIT EST AGE 40-64: CPT | Performed by: STUDENT IN AN ORGANIZED HEALTH CARE EDUCATION/TRAINING PROGRAM

## 2021-05-21 PROCEDURE — 85027 COMPLETE CBC AUTOMATED: CPT | Performed by: STUDENT IN AN ORGANIZED HEALTH CARE EDUCATION/TRAINING PROGRAM

## 2021-05-21 PROCEDURE — 80053 COMPREHEN METABOLIC PANEL: CPT | Performed by: STUDENT IN AN ORGANIZED HEALTH CARE EDUCATION/TRAINING PROGRAM

## 2021-05-21 RX ORDER — ACYCLOVIR 400 MG/1
TABLET ORAL
Qty: 20 TABLET | Refills: 1 | Status: SHIPPED | OUTPATIENT
Start: 2021-05-21 | End: 2022-03-11

## 2021-05-21 RX ORDER — SILDENAFIL 50 MG/1
50 TABLET, FILM COATED ORAL DAILY PRN
Qty: 30 TABLET | Refills: 3 | Status: SHIPPED | OUTPATIENT
Start: 2021-05-21 | End: 2022-03-11

## 2021-05-21 ASSESSMENT — ENCOUNTER SYMPTOMS
DIARRHEA: 0
SORE THROAT: 0
ABDOMINAL PAIN: 1
ARTHRALGIAS: 0
HEMATURIA: 0
JOINT SWELLING: 0
HEADACHES: 0
CHILLS: 0
PALPITATIONS: 0
HEMATOCHEZIA: 0
CONSTIPATION: 0
EYE PAIN: 0
DYSURIA: 0
MYALGIAS: 0
COUGH: 0
FREQUENCY: 0
PARESTHESIAS: 0
FEVER: 0
NAUSEA: 0
HEARTBURN: 0
DIZZINESS: 0
WEAKNESS: 0
SHORTNESS OF BREATH: 0

## 2021-05-21 ASSESSMENT — MIFFLIN-ST. JEOR: SCORE: 1981.12

## 2021-05-21 NOTE — PROGRESS NOTES
SUBJECTIVE:   CC: Gualberto Smith is an 56 year old male who presents for preventative health visit.       Patient has been advised of split billing requirements and indicates understanding: Yes  Healthy Habits:     Getting at least 3 servings of Calcium per day:  NO    Bi-annual eye exam:  NO    Dental care twice a year:  NO    Sleep apnea or symptoms of sleep apnea:  Daytime drowsiness    Diet:  Regular (no restrictions)    Frequency of exercise:  2-3 days/week    Duration of exercise:  15-30 minutes    Taking medications regularly:  Not Applicable    Medication side effects:  Not applicable    PHQ-2 Total Score: 2    Additional concerns today:  No    Had an executive exam done at [facility] 3 years ago. Identified 2-3 hernias--ventral hernia and umbilical hernia at the time. He tried pilates for 1-2 years and stopped due to COVID. Would like referral to surgery.      Working on increasing exercise levels. Tries walking a couple mile circuit 3-4 days per week.      He is working in technology as an executive. Lives with his wife and kids--aged 19, 16.  No consistent with hobbies--busy with family, work. His 18 yo has autism.    Colonoscopy - done 5 years ago at [facility].  Told he should repeat it in 5 years.       Today's PHQ-2 Score:   PHQ-2 ( 1999 Pfizer) 5/21/2021   Q1: Little interest or pleasure in doing things 1   Q2: Feeling down, depressed or hopeless 1   PHQ-2 Score 2   Q1: Little interest or pleasure in doing things Several days   Q2: Feeling down, depressed or hopeless Several days   PHQ-2 Score 2       Abuse: Current or Past(Physical, Sexual or Emotional)- No  Do you feel safe in your environment? Yes    Have you ever done Advance Care Planning? (For example, a Health Directive, POLST, or a discussion with a medical provider or your loved ones about your wishes): No, advance care planning information given to patient to review.  Advanced care planning was discussed at today's visit.    Social History      Tobacco Use     Smoking status: Never Smoker     Smokeless tobacco: Never Used   Substance Use Topics     Alcohol use: Yes     Comment: 3 times per week     If you drink alcohol do you typically have >3 drinks per day or >7 drinks per week? No    Alcohol Use 5/21/2021   Prescreen: >3 drinks/day or >7 drinks/week? Yes   Prescreen: >3 drinks/day or >7 drinks/week? -   AUDIT SCORE  6     AUDIT - Alcohol Use Disorders Identification Test - Reproduced from the World Health Organization Audit 2001 (Second Edition) 5/21/2021   1.  How often do you have a drink containing alcohol? 2 to 3 times a week   2.  How many drinks containing alcohol do you have on a typical day when you are drinking? 3 or 4   3.  How often do you have five or more drinks on one occasion? Monthly   4.  How often during the last year have you found that you were not able to stop drinking once you had started? Never   5.  How often during the last year have you failed to do what was normally expected of you because of drinking? Never   6.  How often during the last year have you needed a first drink in the morning to get yourself going after a heavy drinking session? Never   7.  How often during the last year have you had a feeling of guilt or remorse after drinking? Never   8.  How often during the last year have you been unable to remember what happened the night before because of your drinking? Never   9.  Have you or someone else been injured because of your drinking? No   10. Has a relative, friend, doctor or other health care worker been concerned about your drinking or suggested you cut down? No   TOTAL SCORE 6       Last PSA:   PSA   Date Value Ref Range Status   10/27/2014 0.66 0 - 4 ug/L Final       Reviewed orders with patient. Reviewed health maintenance and updated orders accordingly - Yes  Lab work is in process    Reviewed and updated as needed this visit by clinical staff  Tobacco  Allergies  Meds   Med Hx  Surg Hx  Fam Hx   "Soc Hx        Reviewed and updated as needed this visit by Provider                    Review of Systems   Constitutional: Negative for chills and fever.   HENT: Negative for congestion, ear pain, hearing loss and sore throat.    Eyes: Negative for pain and visual disturbance.   Respiratory: Negative for cough and shortness of breath.    Cardiovascular: Negative for chest pain, palpitations and peripheral edema.   Gastrointestinal: Positive for abdominal pain. Negative for constipation, diarrhea, heartburn, hematochezia and nausea.   Genitourinary: Negative for discharge, dysuria, frequency, genital sores, hematuria, impotence and urgency.   Musculoskeletal: Negative for arthralgias, joint swelling and myalgias.   Skin: Negative for rash.   Neurological: Negative for dizziness, weakness, headaches and paresthesias.   Psychiatric/Behavioral: Negative for mood changes.       OBJECTIVE:   /70 (BP Location: Right arm, Patient Position: Sitting, Cuff Size: Adult Regular)   Pulse 81   Temp 98.7  F (37.1  C) (Tympanic)   Resp 16   Ht 1.905 m (6' 3\")   Wt 107 kg (236 lb)   SpO2 98%   BMI 29.50 kg/m      Physical Exam  GENERAL: healthy, alert and no distress  EYES: Eyes grossly normal to inspection, PERRL and conjunctivae and sclerae normal  HENT: ear canals and TM's normal, nose and mouth without ulcers or lesions  NECK: no adenopathy, no asymmetry, masses, or scars and thyroid normal to palpation  RESP: lungs clear to auscultation - no rales, rhonchi or wheezes  CV: regular rate and rhythm, normal S1 S2, no S3 or S4, no murmur, click or rub, no peripheral edema and peripheral pulses strong  ABDOMEN: soft, nontender, no hepatosplenomegaly, no masses and bowel sounds normal, ventral hernia  MS: no gross musculoskeletal defects noted, no edema  SKIN: no suspicious lesions or rashes  NEURO: Normal strength and tone, mentation intact and speech normal  PSYCH: mentation appears normal, affect " "normal/bright    Diagnostic Test Results:  pending    ASSESSMENT/PLAN:   1. Routine general medical examination at a health care facility  Other items addressed as below.   - Comprehensive metabolic panel (BMP + Alb, Alk Phos, ALT, AST, Total. Bili, TP)  - CBC with platelets    2. Screen for colon cancer  Advised contact [facility] and confirm next colonoscopy was in 5  Years as reportedly normal. Will obtain SHEILA.     4. Need for hepatitis C screening test  - Hepatitis C Screen Reflex to HCV RNA Quant and Genotype    5. Screening for hyperlipidemia    6. Recurrent cold sores  Refilled. Has not had outbreak in over a year.   - acyclovir (ZOVIRAX) 400 MG tablet; TAKE 1 TABLET BY MOUTH THREE TIMES DAILY for 5 days as needed for cold sore.  Dispense: 20 tablet; Refill: 1    7. Erectile dysfunction, unspecified erectile dysfunction type  Refilled. Will try lower dose. Previously was prescribed 100 mg.   - sildenafil (VIAGRA) 50 MG tablet; Take 1 tablet (50 mg) by mouth daily as needed (erectile dysfunction) 30-60 minutes before intercourse  Dispense: 30 tablet; Refill: 3    8. Ventral hernia without obstruction or gangrene  Referral for consultation re hernia repair.   - GENERAL SURG ADULT REFERRAL; Future    9. Screening for prostate cancer  Routine screen.   - PSA, screen    10. Screening for diabetes mellitus  - Hemoglobin A1c    11. Screening for lipid disorders  - Lipid panel reflex to direct LDL Non-fasting    Patient has been advised of split billing requirements and indicates understanding: Yes  COUNSELING:   Reviewed preventive health counseling, as reflected in patient instructions    Estimated body mass index is 29.5 kg/m  as calculated from the following:    Height as of this encounter: 1.905 m (6' 3\").    Weight as of this encounter: 107 kg (236 lb).     Weight management plan: Discussed healthy diet and exercise guidelines    He reports that he has never smoked. He has never used smokeless " tobacco.      Counseling Resources:  ATP IV Guidelines  Pooled Cohorts Equation Calculator  FRAX Risk Assessment  ICSI Preventive Guidelines  Dietary Guidelines for Americans, 2010  USDA's MyPlate  ASA Prophylaxis  Lung CA Screening    Alexia Cardona MD  [facility]

## 2021-05-21 NOTE — PATIENT INSTRUCTIONS
Call [facility] and confirm you need a colonoscopy now rather than 10 years from your last test.         Preventive Health Recommendations  Male Ages 50 - 64    Yearly exam:             See your health care provider every year in order to  o   Review health changes.   o   Discuss preventive care.    o   Review your medicines if your doctor has prescribed any.     Have a cholesterol test every 5 years, or more frequently if you are at risk for high cholesterol/heart disease.     Have a diabetes test (fasting glucose) every three years. If you are at risk for diabetes, you should have this test more often.     Have a colonoscopy at age 50, or have a yearly FIT test (stool test). These exams will check for colon cancer.      Talk with your health care provider about whether or not a prostate cancer screening test (PSA) is right for you.    You should be tested each year for STDs (sexually transmitted diseases), if you re at risk.     Shots: Get a flu shot each year. Get a tetanus shot every 10 years.     Nutrition:    Eat at least 5 servings of fruits and vegetables daily.     Eat whole-grain bread, whole-wheat pasta and brown rice instead of white grains and rice.     Get adequate Calcium and Vitamin D.     Lifestyle    Exercise for at least 150 minutes a week (30 minutes a day, 5 days a week). This will help you control your weight and prevent disease.     Limit alcohol to one drink per day.     No smoking.     Wear sunscreen to prevent skin cancer.     See your dentist every six months for an exam and cleaning.     See your eye doctor every 1 to 2 years.

## 2021-05-22 LAB — PSA SERPL-ACNC: 0.42 UG/L (ref 0–4)

## 2021-05-23 NOTE — RESULT ENCOUNTER NOTE
Hi Gualberto,    Labs are normal with exception of cholesterol. However HDL is in a good range. Your cholesterol level is above goal. High cholesterol--among other lifestyle factors--increases the risk of heart disease and stroke. You can help lower your cholesterol through lifestyle changes and specifically healthy nutrition and physical activity. A mediterranean style diet has been proven to lower cholesterol. This includes a focus on vegetables, fresh fruits, olive oil, legumes, low mercury fish, lean protein, low fat dairy and whole grains. See my specific dietary recommendations below. It is also recommended to get at least 150 minutes of moderate intensity exercise per week including strength training.      Alexia Cardona MD    Foods to eat and what to avoid/minimize to lower cholesterol:      Foods to avoid:  -Processed foods (eg. frozen meals, pre packaged foods)  -Sucrose and fructose (check the ingredients)  -Refined carbohydrates (eg. white flour bread and crackers, pasta)  -Fast food  -Too much animal fat (red meat, excessive butter or dairy)  -Food or drink in plastic containers  -Soda, fruit juice, sweetened beverages  -[place] large meals  -High sugar fruits (eg. chandler, pineapple, watermelon, grapes, cherries, bananas)     Foods to eat:  -Unlimited colorful vegetables  -Unlimited leafy greens  -Polyunsaturated and monounsaturated fats (see below)  -Olive oil  -Lean meat (turkey, chicken)  -Fish and seafood (eg. Sardines, salmon, mackerel)  -Whole grains (eg. Quinoa, oatmeal, buckwheat, brown rice, barley, rye, spelt)  -Avocados  -Unsalted nuts and seeds, raw when possible  -Eggs in moderation  -Berries, dary, limes  -Cinnamon   -Drink water, herbal tea, green tea    Healthiest sources of fats: olives, olive oil, avocado and avocado oil, canola oil, almonds, cashews, hazelnuts, macadamia nuts, peanuts, pecans, pistachios, flax seeds...    Tips: Cook with avocado oil. Add olive oil AFTER you cook foods to  preserve the nutrients. When cooking a meal, make extra to eat the next day. Plan your meals ahead of time.

## 2021-05-24 LAB — HCV AB SERPL QL IA: NONREACTIVE

## 2021-10-24 ENCOUNTER — HEALTH MAINTENANCE LETTER (OUTPATIENT)
Age: 56
End: 2021-10-24

## 2022-03-10 PROBLEM — D18.01 HEMANGIOMA OF SKIN AND SUBCUTANEOUS TISSUE: Status: ACTIVE | Noted: 2022-03-10

## 2022-03-10 PROBLEM — L57.8 SOLAR ELASTOSIS: Status: ACTIVE | Noted: 2022-03-10

## 2022-03-10 PROBLEM — S09.90XA INJURY OF HEAD: Status: ACTIVE | Noted: 2022-03-10

## 2022-03-10 PROBLEM — L82.1 SEBORRHEIC KERATOSIS: Status: ACTIVE | Noted: 2022-03-10

## 2022-03-10 PROBLEM — L81.9 OTHER DYSCHROMIA: Status: ACTIVE | Noted: 2022-03-10

## 2022-03-11 ENCOUNTER — OFFICE VISIT (OUTPATIENT)
Dept: INTERNAL MEDICINE | Facility: CLINIC | Age: 57
End: 2022-03-11
Payer: COMMERCIAL

## 2022-03-11 VITALS
WEIGHT: 228 LBS | DIASTOLIC BLOOD PRESSURE: 80 MMHG | SYSTOLIC BLOOD PRESSURE: 130 MMHG | BODY MASS INDEX: 28.35 KG/M2 | HEART RATE: 72 BPM | OXYGEN SATURATION: 98 % | HEIGHT: 75 IN

## 2022-03-11 DIAGNOSIS — E55.9 VITAMIN D DEFICIENCY: ICD-10-CM

## 2022-03-11 DIAGNOSIS — Z11.4 SCREENING FOR HIV (HUMAN IMMUNODEFICIENCY VIRUS): ICD-10-CM

## 2022-03-11 DIAGNOSIS — Z87.898 HISTORY OF SYNCOPE: ICD-10-CM

## 2022-03-11 DIAGNOSIS — Z00.00 PREVENTATIVE HEALTH CARE: Primary | ICD-10-CM

## 2022-03-11 DIAGNOSIS — B00.1 RECURRENT COLD SORES: ICD-10-CM

## 2022-03-11 DIAGNOSIS — M62.08 RECTUS DIASTASIS: ICD-10-CM

## 2022-03-11 DIAGNOSIS — Z12.5 PROSTATE CANCER SCREENING: ICD-10-CM

## 2022-03-11 DIAGNOSIS — K40.90 NON-RECURRENT UNILATERAL INGUINAL HERNIA WITHOUT OBSTRUCTION OR GANGRENE: ICD-10-CM

## 2022-03-11 DIAGNOSIS — N52.9 ERECTILE DYSFUNCTION, UNSPECIFIED ERECTILE DYSFUNCTION TYPE: ICD-10-CM

## 2022-03-11 DIAGNOSIS — K42.9 UMBILICAL HERNIA WITHOUT OBSTRUCTION AND WITHOUT GANGRENE: ICD-10-CM

## 2022-03-11 LAB
ANION GAP SERPL CALCULATED.3IONS-SCNC: 12 MMOL/L (ref 5–18)
BUN SERPL-MCNC: 14 MG/DL (ref 8–22)
CALCIUM SERPL-MCNC: 9.9 MG/DL (ref 8.5–10.5)
CHLORIDE BLD-SCNC: 101 MMOL/L (ref 98–107)
CHOLEST SERPL-MCNC: 240 MG/DL
CO2 SERPL-SCNC: 26 MMOL/L (ref 22–31)
CREAT SERPL-MCNC: 1 MG/DL (ref 0.7–1.3)
FASTING STATUS PATIENT QL REPORTED: YES
GFR SERPL CREATININE-BSD FRML MDRD: 88 ML/MIN/1.73M2
GLUCOSE BLD-MCNC: 93 MG/DL (ref 70–125)
HDLC SERPL-MCNC: 44 MG/DL
HIV 1+2 AB+HIV1 P24 AG SERPL QL IA: NEGATIVE
LDLC SERPL CALC-MCNC: 173 MG/DL
POTASSIUM BLD-SCNC: 4.8 MMOL/L (ref 3.5–5)
PSA SERPL-MCNC: 0.61 UG/L (ref 0–3.5)
SODIUM SERPL-SCNC: 139 MMOL/L (ref 136–145)
TRIGL SERPL-MCNC: 115 MG/DL

## 2022-03-11 PROCEDURE — 82306 VITAMIN D 25 HYDROXY: CPT | Performed by: INTERNAL MEDICINE

## 2022-03-11 PROCEDURE — 99396 PREV VISIT EST AGE 40-64: CPT | Mod: 25 | Performed by: INTERNAL MEDICINE

## 2022-03-11 PROCEDURE — 87389 HIV-1 AG W/HIV-1&-2 AB AG IA: CPT | Performed by: INTERNAL MEDICINE

## 2022-03-11 PROCEDURE — 36415 COLL VENOUS BLD VENIPUNCTURE: CPT | Performed by: INTERNAL MEDICINE

## 2022-03-11 PROCEDURE — G0103 PSA SCREENING: HCPCS | Performed by: INTERNAL MEDICINE

## 2022-03-11 PROCEDURE — 80061 LIPID PANEL: CPT | Performed by: INTERNAL MEDICINE

## 2022-03-11 PROCEDURE — 80048 BASIC METABOLIC PNL TOTAL CA: CPT | Performed by: INTERNAL MEDICINE

## 2022-03-11 RX ORDER — SILDENAFIL 50 MG/1
50 TABLET, FILM COATED ORAL DAILY PRN
Qty: 30 TABLET | Refills: 3 | Status: SHIPPED | OUTPATIENT
Start: 2022-03-11

## 2022-03-11 RX ORDER — ACYCLOVIR 400 MG/1
TABLET ORAL
Qty: 20 TABLET | Refills: 1 | Status: SHIPPED | OUTPATIENT
Start: 2022-03-11

## 2022-03-11 NOTE — PROGRESS NOTES
ASSESSMENT/PLAN:     ASSESSMENT and PLAN:  1. Preventative health care  Obtain colonoscopy records.  Update labs.  Advise shingles shot at his pharmacy  - ZOSTER VACCINE RECOMBINANT (Shingrix)  - Lipid panel reflex to direct LDL Fasting; Future  - REVIEW OF HEALTH MAINTENANCE PROTOCOL ORDERS  - Lipid panel reflex to direct LDL Fasting    2. Recurrent cold sores  - acyclovir (ZOVIRAX) 400 MG tablet; TAKE 1 TABLET BY MOUTH THREE TIMES DAILY for 5 days as needed for cold sore.  Dispense: 20 tablet; Refill: 1    3. Erectile dysfunction, unspecified erectile dysfunction type  - sildenafil (VIAGRA) 50 MG tablet; Take 1 tablet (50 mg) by mouth daily as needed (erectile dysfunction) 30-60 minutes before intercourse  Dispense: 30 tablet; Refill: 3    4. Screening for HIV (human immunodeficiency virus)  - HIV Antigen Antibody Combo; Future  - HIV Antigen Antibody Combo    5. Non-recurrent unilateral inguinal hernia without obstruction or gangrene  Noted at [facility].  Connect through [employer] and referred to surgery to discuss surgical repair  - Adult General Surg Referral; Future    6. Vitamin D deficiency  - Vitamin D Deficiency; Future  - Vitamin D Deficiency    7. History of syncope  Obtain records of hospitalization at [facility].  Suspect vasovagal but may also be more ominous.  Discount dehydration diagnosed  - Basic metabolic panel; Future  - Basic metabolic panel    8. Prostate cancer screening  - Prostate Specific Antigen Screen; Future  - Prostate Specific Antigen Screen    9. Umbilical hernia without obstruction and without gangrene  - Adult General Surg Referral; Future    10. Rectus diastasis    Preventive Care Assessed: As above     Patient Instructions   Inguinal hernia can be fixed    Umbilical hernia can be fixed    Bulging of abdomen is Rectus diastasis is usually benign, and not fixed, or fixable    See Dr Austin Oviedo general surgeon about hernia repair    Most common rash of trunk is pityriasis  "rosea    Benign fainting is vasovagal syncope fainting from a low pulse, which causes low blood pressure, a quick faint.  Driven by outside stimuli like pain, or fear, or straining     More serious cause of fainting is pacemaker time.  The heart slows down briefly and drops the blood pressure     You had colonoscopy 2017 which was normal, repeat 10 years, we need the records    Shots up to date except research Shingrix shot to prevent shingles, pharmacy or clinic    Link [facility] to [place] and [facility] for records, and we need colonoscopy    Monitor own pulse and bp with bp cuff or oximeter or both, or fitbit    Goal bp is half your age 125-130, and below 90 bottom        Medication list carefully reviewed and corrected  Epic Merger Problem list addressed and updated     Return in about 6 months (around 9/11/2022) for using a video visit.    COUNSELING:   Reviewed preventive health counseling, as reflected in patient instructions       Regular exercise       Colorectal cancer screening       Prostate cancer screening    Estimated body mass index is 28.31 kg/m  as calculated from the following:    Height as of this encounter: 1.911 m (6' 3.25\").    Weight as of this encounter: 103.4 kg (228 lb).           SUBJECTIVE:     Gualberto Smith is an 56 year old who presents for preventative health visit.     Patient has been advised of split billing requirements and indicates understanding: Yes  Healthy Habits:     Getting at least 3 servings of Calcium per day:  Yes    Bi-annual eye exam:  NO    Dental care twice a year:  Yes    Sleep apnea or symptoms of sleep apnea:  Daytime drowsiness and Excessive snoring    Diet:  Regular (no restrictions)    Frequency of exercise:  None    Taking medications regularly:  Yes    Medication side effects:  Not applicable    PHQ-2 Total Score: 2    Additional concerns today:  Yes        Today's PHQ-2 Score:   PHQ-2 ( 1999 Pfizer) 3/11/2022   Q1: Little interest or pleasure in doing things 1   Q2: " Feeling down, depressed or hopeless 1   PHQ-2 Score 2   PHQ-2 Total Score (12-17 Years)- Positive if 3 or more points; Administer PHQ-A if positive -   Q1: Little interest or pleasure in doing things Several days   Q2: Feeling down, depressed or hopeless Several days   PHQ-2 Score 2       Abuse: Current or Past(Physical, Sexual or Emotional)- No   Do you feel safe in your environment? Yes       Alcohol Use 3/11/2022   Prescreen: >3 drinks/day or >7 drinks/week? Yes   Prescreen: >3 drinks/day or >7 drinks/week? -   AUDIT SCORE  5       Reviewed and updated as needed this visit by clinical staff   Tobacco  Allergies  Meds              CHIEF COMPLAINT:  Chief Complaint   Patient presents with     Physical       HPI: New patient to me referred    Underwent executive physical at [facility] and was told he had hernias that should be fixed.  They will occasionally cause pain but he has never had symptoms of a bowel obstruction    He had an episode of syncope in October.  He was evaluated at [facility].  Paramedics transported him.  We are unable to access the records at this time.  He does not monitor heart rate or blood pressure frequently.  Several blood pressures have been elevated at different times in the past and diastolics have occasionally been under 100.  He does not feel palpitations.  He does have a history of fainting in childhood    Review of Systems: Slight peripheral edema worse in the evening.  Normal bowels.  No chest pain or shortness of breath.  Please see above.      Patient Care Team:  Ede Joseph MD as PCP - General (Internal Medicine)  Wegener, Joel Daniel Irwin, MD as MD (Family Practice)  Wegener, Joel Daniel Irwin, MD as MD (Family Practice)  Alexia Cardona MD as Assigned PCP     Patient Active Problem List   Diagnosis     ED (erectile dysfunction)     Recurrent cold sores     Disorder of bursae and tendons in shoulder region     Injury of head     Other dyschromia     Seborrheic  keratosis     Solar elastosis     Hemangioma of skin and subcutaneous tissue     Non-recurrent unilateral inguinal hernia     Umbilical hernia without obstruction and without gangrene     Past Medical History:   Diagnosis Date     NO ACTIVE PROBLEMS       Past Surgical History:   Procedure Laterality Date     NO HISTORY OF SURGERY        Family History   Problem Relation Age of Onset     Cancer Mother         lung,brain     Cerebrovascular Disease Father         70's     Hypertension Father      Respiratory Father      Heart Disease Maternal Grandfather      Cancer Sister         two of his sisters diagnosed with skin cancer-melanoma      Social History     Socioeconomic History     Marital status:      Spouse name: Not on file     Number of children: 2     Years of education: Not on file     Highest education level: Not on file   Occupational History     Occupation: Business     Employer: [employer]     Employer: [employer]   Tobacco Use     Smoking status: Never Smoker     Smokeless tobacco: Never Used   Substance and Sexual Activity     Alcohol use: Yes     Comment: 3 times per week     Drug use: No     Sexual activity: Yes     Partners: Female   Other Topics Concern     Parent/sibling w/ CABG, MI or angioplasty before 65F 55M? No   Social History Narrative    , 2 children              Social Determinants of Health     Financial Resource Strain: Not on file   Food Insecurity: Not on file   Transportation Needs: Not on file   Physical Activity: Not on file   Stress: Not on file   Social Connections: Not on file   Intimate Partner Violence: Not on file   Housing Stability: Not on file      Social History     Social History Narrative    , 2 children                Current Outpatient Medications   Medication Sig Dispense Refill     acetaminophen 650 MG TABS Take 650 mg by mouth every 4 hours as needed for mild pain 100 tablet      acyclovir (ZOVIRAX) 400 MG  "tablet TAKE 1 TABLET BY MOUTH THREE TIMES DAILY for 5 days as needed for cold sore. 20 tablet 1     multivitamin, therapeutic with minerals (MULTI-VITAMIN) TABS Take 1 tablet by mouth daily 100 tablet 3     sildenafil (VIAGRA) 50 MG tablet Take 1 tablet (50 mg) by mouth daily as needed (erectile dysfunction) 30-60 minutes before intercourse 30 tablet 3          OBJECTIVE:   VITALS:  Vitals:    03/11/22 1126   BP: 130/80   Pulse: 72   SpO2: 98%   Weight: 103.4 kg (228 lb)   Height: 1.911 m (6' 3.25\")     /80   Pulse 72   Ht 1.911 m (6' 3.25\")   Wt 103.4 kg (228 lb)   SpO2 98%   BMI 28.31 kg/m   Estimated body mass index is 28.31 kg/m  as calculated from the following:    Height as of this encounter: 1.911 m (6' 3.25\").    Weight as of this encounter: 103.4 kg (228 lb).    PHYSICAL EXAM:  Constitutional:  Reveals a pleasant healthy appearing middle-aged man who ambulates without assistance  Palpation of radial pulse regular but slow  Ears:  Clear without wax   Thyroid:  Non palpable   Cardiac; Regular rate and rhythm.  No murmur  Lungs: Clear.  Respiratory effort normal.  Edema of Legs: None   Psychiatric:  Alert and oriented     Rectus diastasis noted.  Slight umbilical hernia    He reports that he has never smoked. He has never used smokeless tobacco.      Recent Labs   Lab Test 05/21/21  1554   CHOL 217*   GLC 97   PSA 0.42     Recent Labs   Lab Test 05/21/21  1554 04/17/20  1741   HGB 14.4 14.8     --    POTASSIUM 4.2  --    CR 1.09  --    A1C 5.6  --    PSA 0.42  --      No results found for: VITDT  No components found for: VITD      Start Time: 11:39 AM  End time:  12:31 PM  Visit plus orders: 52 minutes  Dictation time:  3 minutes    The visit lasted a total of 55 minutes       Ede Joseph MD  [facility]  "

## 2022-03-11 NOTE — PATIENT INSTRUCTIONS
Inguinal hernia can be fixed    Umbilical hernia can be fixed    Bulging of abdomen is Rectus diastasis is usually benign, and not fixed, or fixable    See Dr Austin Oviedo general surgeon about hernia repair    Most common rash of trunk is pityriasis rosea    Benign fainting is vasovagal syncope fainting from a low pulse, which causes low blood pressure, a quick faint.  Driven by outside stimuli like pain, or fear, or straining     More serious cause of fainting is pacemaker time.  The heart slows down briefly and drops the blood pressure     You had colonoscopy 2017 which was normal, repeat 10 years, we need the records    Shots up to date except research Shingrix shot to prevent shingles, pharmacy or clinic    Link [facility] to [place] and [facility] for records, and we need colonoscopy    Monitor own pulse and bp with bp cuff or oximeter or both, or fitbit    Goal bp is half your age 125-130, and below 90 bottom

## 2022-03-12 NOTE — TELEPHONE ENCOUNTER
"  Admission Medication History     The home medication history was taken by Rin Weldon CPhT.    Medication history obtained from Patient     You may go to "Admission" then "Reconcile Home Medications" tabs to review and/or act upon these items.      The home medication list has been updated by the Pharmacy department.    Please read ALL comments highlighted in yellow.    Please address this information as you see fit.     Feel free to contact us if you have any questions or require assistance.      The medications listed below were removed from the home medication list.  Please reorder if appropriate:  Patient reports no longer taking the following medication(s):   Magic Mouthwash    Alendronate 70mg   Aspirin 81mg   Calcium    Bi-Flex   Omeprazole 40mg   Sertaline 25mg      Rin Weldon CPhT.  [phone]                .          " Will await fax. Should this phone note remain open or can we close it until office visit or further contact from patient/[facility] records?    Arash Ness MD  Family Medicine Physician

## 2022-03-13 LAB — DEPRECATED CALCIDIOL+CALCIFEROL SERPL-MC: 11 UG/L (ref 30–80)

## 2022-07-29 ENCOUNTER — OFFICE VISIT (OUTPATIENT)
Dept: SURGERY | Facility: CLINIC | Age: 57
End: 2022-07-29
Attending: INTERNAL MEDICINE
Payer: COMMERCIAL

## 2022-07-29 VITALS
SYSTOLIC BLOOD PRESSURE: 134 MMHG | DIASTOLIC BLOOD PRESSURE: 86 MMHG | WEIGHT: 227 LBS | HEIGHT: 76 IN | BODY MASS INDEX: 27.64 KG/M2

## 2022-07-29 DIAGNOSIS — K40.90 NON-RECURRENT UNILATERAL INGUINAL HERNIA WITHOUT OBSTRUCTION OR GANGRENE: ICD-10-CM

## 2022-07-29 DIAGNOSIS — K42.9 UMBILICAL HERNIA WITHOUT OBSTRUCTION AND WITHOUT GANGRENE: ICD-10-CM

## 2022-07-29 PROCEDURE — 99202 OFFICE O/P NEW SF 15 MIN: CPT | Performed by: SPECIALIST

## 2022-07-29 NOTE — LETTER
7/29/2022         RE: Gualberto Smith  [address] [place] [address]        Dear Colleague,    Thank you for referring your patient, Gualberto Smith, to the [facility] [place]. Please see a copy of my visit note below.        [facility] Surgery Consult    HPI:    57 year old year old male who I have been consulted by Ede Joseph for evaluation of Hernia (Non-recurrent unilateral inguinal hernia - 3 total - no recent imaging )  He is thought to have a hernia was seen at [place] and he said he had a full eXecutive work-up and they told him that he may have hernias in his inguinal region they did not find on exam but there must of found it on some scan possibly he thinks.  Comes in to get these repaired    Allergies:Patient has no known allergies.    Past Medical History:   Diagnosis Date     NO ACTIVE PROBLEMS        Past Surgical History:   Procedure Laterality Date     NO HISTORY OF SURGERY         CURRENT MEDS:  Current Outpatient Medications   Medication     acetaminophen 650 MG TABS     acyclovir (ZOVIRAX) 400 MG tablet     multivitamin w/minerals (THERA-VIT-M) tablet     sildenafil (VIAGRA) 50 MG tablet     No current facility-administered medications for this visit.       Family History   Problem Relation Age of Onset     Cancer Mother         lung,brain     Cerebrovascular Disease Father         70's     Hypertension Father      Respiratory Father      Heart Disease Maternal Grandfather      Cancer Sister         two of his sisters diagnosed with skin cancer-melanoma        reports that he has never smoked. He has never used smokeless tobacco. He reports current alcohol use. He reports that he does not use drugs.    Review of Systems:  Negative except no symptoms does not have groin pain and no bulges noted over no other real symptomatology noted from this.Otherwise twelve system of review is negative.      OBJECTIVE:  Vitals:    07/29/22  "1549   BP: 134/86   Weight: 227 lb (103 kg)   Height: 6' 4\" (1.93 m)     Body mass index is 27.63 kg/m .    EXAM:  GENERAL: This is a well-developed 57 year old male who appears his stated age  HEAD: normocephalic  HEENT: Pupils equal and reactive bilaterally  MOUTH: mucus membranes intact  CARDIAC: RRR without murmur  CHEST/LUNG:  Clear to auscultation  ABDOMEN: Soft, nontender, nondistended, no masses no masses are noted no hernias are noted both inguinal femoral , umbilical hernia small and reducible  EXTREMITIES: Grossly normal, warm,   NEUROLOGIC: Focally intact, nonfocal  INTEGUMENT: No open lesions or ulcers  VASCULAR: Pulses intact, symmetrical upper and lower extremities.        LABS:  Lab Results   Component Value Date    WBC 6.3 05/21/2021    HGB 14.4 05/21/2021    HCT 44.0 05/21/2021    MCV 83 05/21/2021     05/21/2021       Lab Results   Component Value Date    ALT 52 05/21/2021    AST 24 05/21/2021    ALKPHOS 63 05/21/2021        Images:     Assessment/Plan:     By report has hernias I cannot find hernias on exam today I do not think he should have anything done it for sure if he has any surgical.  He should just follow these and watch these of some changes come back and see us.  Discussed and answered questions to him today.    Discussed repair of the umbilical hernia was quite small at this time as needed, folacin watch it.    No follow-ups on file.    Austin Oviedo MD  General Surgery [phone]  Vascular Surgery [phone]          Again, thank you for allowing me to participate in the care of your patient.        Sincerely,        Austin Oviedo MD    "

## 2022-07-31 ENCOUNTER — HEALTH MAINTENANCE LETTER (OUTPATIENT)
Age: 57
End: 2022-07-31

## 2022-08-09 NOTE — PROGRESS NOTES
"[facility] Surgery Consult    HPI:    57 year old year old male who I have been consulted by Ede Joseph for evaluation of Hernia (Non-recurrent unilateral inguinal hernia - 3 total - no recent imaging )  He is thought to have a hernia was seen at [place] and he said he had a full eXecutive work-up and they told him that he may have hernias in his inguinal region they did not find on exam but there must of found it on some scan possibly he thinks.  Comes in to get these repaired    Allergies:Patient has no known allergies.    Past Medical History:   Diagnosis Date     NO ACTIVE PROBLEMS        Past Surgical History:   Procedure Laterality Date     NO HISTORY OF SURGERY         CURRENT MEDS:  Current Outpatient Medications   Medication     acetaminophen 650 MG TABS     acyclovir (ZOVIRAX) 400 MG tablet     multivitamin w/minerals (THERA-VIT-M) tablet     sildenafil (VIAGRA) 50 MG tablet     No current facility-administered medications for this visit.       Family History   Problem Relation Age of Onset     Cancer Mother         lung,brain     Cerebrovascular Disease Father         70's     Hypertension Father      Respiratory Father      Heart Disease Maternal Grandfather      Cancer Sister         two of his sisters diagnosed with skin cancer-melanoma        reports that he has never smoked. He has never used smokeless tobacco. He reports current alcohol use. He reports that he does not use drugs.    Review of Systems:  Negative except no symptoms does not have groin pain and no bulges noted over no other real symptomatology noted from this.Otherwise twelve system of review is negative.      OBJECTIVE:  Vitals:    07/29/22 1549   BP: 134/86   Weight: 227 lb (103 kg)   Height: 6' 4\" (1.93 m)     Body mass index is 27.63 kg/m .    EXAM:  GENERAL: This is a well-developed 57 year old male who appears his stated age  HEAD: normocephalic  HEENT: Pupils equal and reactive bilaterally  MOUTH: mucus " membranes intact  CARDIAC: RRR without murmur  CHEST/LUNG:  Clear to auscultation  ABDOMEN: Soft, nontender, nondistended, no masses no masses are noted no hernias are noted both inguinal femoral , umbilical hernia small and reducible  EXTREMITIES: Grossly normal, warm,   NEUROLOGIC: Focally intact, nonfocal  INTEGUMENT: No open lesions or ulcers  VASCULAR: Pulses intact, symmetrical upper and lower extremities.        LABS:  Lab Results   Component Value Date    WBC 6.3 05/21/2021    HGB 14.4 05/21/2021    HCT 44.0 05/21/2021    MCV 83 05/21/2021     05/21/2021       Lab Results   Component Value Date    ALT 52 05/21/2021    AST 24 05/21/2021    ALKPHOS 63 05/21/2021        Images:     Assessment/Plan:     By report has hernias I cannot find hernias on exam today I do not think he should have anything done it for sure if he has any surgical.  He should just follow these and watch these of some changes come back and see us.  Discussed and answered questions to him today.    Discussed repair of the umbilical hernia was quite small at this time as needed, folamerico watch it.    No follow-ups on file.    Austin Oviedo MD  General Surgery [phone]  Vascular Surgery [phone]

## 2022-08-25 ENCOUNTER — TRANSFERRED RECORDS (OUTPATIENT)
Dept: HEALTH INFORMATION MANAGEMENT | Facility: CLINIC | Age: 57
End: 2022-08-25

## 2022-08-25 ENCOUNTER — HOSPITAL ENCOUNTER (EMERGENCY)
Facility: CLINIC | Age: 57
Discharge: HOME | End: 2022-08-25
Attending: EMERGENCY MEDICINE | Admitting: EMERGENCY MEDICINE
Payer: COMMERCIAL

## 2022-08-25 ENCOUNTER — APPOINTMENT (OUTPATIENT)
Dept: CT IMAGING | Facility: CLINIC | Age: 57
End: 2022-08-25
Attending: EMERGENCY MEDICINE
Payer: COMMERCIAL

## 2022-08-25 VITALS
WEIGHT: 225 LBS | OXYGEN SATURATION: 98 % | BODY MASS INDEX: 27.4 KG/M2 | DIASTOLIC BLOOD PRESSURE: 99 MMHG | SYSTOLIC BLOOD PRESSURE: 148 MMHG | HEART RATE: 93 BPM | HEIGHT: 76 IN | TEMPERATURE: 98.6 F

## 2022-08-25 DIAGNOSIS — R10.2 PERINEAL PAIN IN MALE: ICD-10-CM

## 2022-08-25 DIAGNOSIS — R10.30 INGUINAL PAIN, UNSPECIFIED LATERALITY: ICD-10-CM

## 2022-08-25 LAB
ALBUMIN SERPL-MCNC: 3.7 G/DL (ref 3.4–5)
ALBUMIN UR-MCNC: NEGATIVE MG/DL
ALP SERPL-CCNC: 66 U/L (ref 40–150)
ALT SERPL W P-5'-P-CCNC: 39 U/L (ref 0–70)
ANION GAP SERPL CALCULATED.3IONS-SCNC: 5 MMOL/L (ref 3–14)
APPEARANCE UR: CLEAR
AST SERPL W P-5'-P-CCNC: 15 U/L (ref 0–45)
BASOPHILS # BLD AUTO: 0 10E3/UL (ref 0–0.2)
BASOPHILS NFR BLD AUTO: 0 %
BILIRUB DIRECT SERPL-MCNC: 0.1 MG/DL (ref 0–0.2)
BILIRUB SERPL-MCNC: 0.5 MG/DL (ref 0.2–1.3)
BILIRUB UR QL STRIP: NEGATIVE
BUN SERPL-MCNC: 19 MG/DL (ref 7–30)
CALCIUM SERPL-MCNC: 9.3 MG/DL (ref 8.5–10.1)
CHLORIDE BLD-SCNC: 101 MMOL/L (ref 94–109)
CO2 SERPL-SCNC: 29 MMOL/L (ref 20–32)
COLOR UR AUTO: NORMAL
CREAT SERPL-MCNC: 1.17 MG/DL (ref 0.66–1.25)
CRP SERPL-MCNC: <2.9 MG/L (ref 0–8)
EOSINOPHIL # BLD AUTO: 0.1 10E3/UL (ref 0–0.7)
EOSINOPHIL NFR BLD AUTO: 1 %
ERYTHROCYTE [DISTWIDTH] IN BLOOD BY AUTOMATED COUNT: 13.3 % (ref 10–15)
GFR SERPL CREATININE-BSD FRML MDRD: 72 ML/MIN/1.73M2
GLUCOSE BLD-MCNC: 101 MG/DL (ref 70–99)
GLUCOSE UR STRIP-MCNC: NEGATIVE MG/DL
HCT VFR BLD AUTO: 50.9 % (ref 40–53)
HGB BLD-MCNC: 16.2 G/DL (ref 13.3–17.7)
HGB UR QL STRIP: NEGATIVE
HOLD SPECIMEN: NORMAL
IMM GRANULOCYTES # BLD: 0.1 10E3/UL
IMM GRANULOCYTES NFR BLD: 1 %
KETONES UR STRIP-MCNC: NEGATIVE MG/DL
LEUKOCYTE ESTERASE UR QL STRIP: NEGATIVE
LYMPHOCYTES # BLD AUTO: 1.6 10E3/UL (ref 0.8–5.3)
LYMPHOCYTES NFR BLD AUTO: 18 %
MCH RBC QN AUTO: 27.3 PG (ref 26.5–33)
MCHC RBC AUTO-ENTMCNC: 31.8 G/DL (ref 31.5–36.5)
MCV RBC AUTO: 86 FL (ref 78–100)
MONOCYTES # BLD AUTO: 0.7 10E3/UL (ref 0–1.3)
MONOCYTES NFR BLD AUTO: 8 %
NEUTROPHILS # BLD AUTO: 6.5 10E3/UL (ref 1.6–8.3)
NEUTROPHILS NFR BLD AUTO: 72 %
NITRATE UR QL: NEGATIVE
NRBC # BLD AUTO: 0 10E3/UL
NRBC BLD AUTO-RTO: 0 /100
PH UR STRIP: 5.5 [PH] (ref 5–7)
PLATELET # BLD AUTO: 180 10E3/UL (ref 150–450)
POTASSIUM BLD-SCNC: 3.9 MMOL/L (ref 3.4–5.3)
PROT SERPL-MCNC: 7.2 G/DL (ref 6.8–8.8)
RBC # BLD AUTO: 5.93 10E6/UL (ref 4.4–5.9)
SODIUM SERPL-SCNC: 135 MMOL/L (ref 133–144)
SP GR UR STRIP: 1.01 (ref 1–1.03)
UROBILINOGEN UR STRIP-MCNC: NORMAL MG/DL
WBC # BLD AUTO: 9 10E3/UL (ref 4–11)

## 2022-08-25 PROCEDURE — 81003 URINALYSIS AUTO W/O SCOPE: CPT | Performed by: EMERGENCY MEDICINE

## 2022-08-25 PROCEDURE — 250N000011 HC RX IP 250 OP 636: Performed by: EMERGENCY MEDICINE

## 2022-08-25 PROCEDURE — 86140 C-REACTIVE PROTEIN: CPT | Performed by: EMERGENCY MEDICINE

## 2022-08-25 PROCEDURE — 74177 CT ABD & PELVIS W/CONTRAST: CPT

## 2022-08-25 PROCEDURE — 85025 COMPLETE CBC W/AUTO DIFF WBC: CPT | Performed by: EMERGENCY MEDICINE

## 2022-08-25 PROCEDURE — 36415 COLL VENOUS BLD VENIPUNCTURE: CPT | Performed by: EMERGENCY MEDICINE

## 2022-08-25 PROCEDURE — 99285 EMERGENCY DEPT VISIT HI MDM: CPT | Mod: 25

## 2022-08-25 PROCEDURE — 82248 BILIRUBIN DIRECT: CPT | Performed by: EMERGENCY MEDICINE

## 2022-08-25 PROCEDURE — 250N000009 HC RX 250: Performed by: EMERGENCY MEDICINE

## 2022-08-25 PROCEDURE — 80053 COMPREHEN METABOLIC PANEL: CPT | Performed by: EMERGENCY MEDICINE

## 2022-08-25 RX ORDER — IOPAMIDOL 755 MG/ML
113 INJECTION, SOLUTION INTRAVASCULAR ONCE
Status: COMPLETED | OUTPATIENT
Start: 2022-08-25 | End: 2022-08-25

## 2022-08-25 RX ORDER — GABAPENTIN 300 MG/1
300 CAPSULE ORAL 3 TIMES DAILY
Qty: 42 CAPSULE | Refills: 0 | Status: SHIPPED | OUTPATIENT
Start: 2022-08-25 | End: 2022-09-09

## 2022-08-25 RX ADMIN — IOPAMIDOL 113 ML: 755 INJECTION, SOLUTION INTRAVENOUS at 17:28

## 2022-08-25 RX ADMIN — SODIUM CHLORIDE 72 ML: 9 INJECTION, SOLUTION INTRAVENOUS at 17:28

## 2022-08-25 ASSESSMENT — ENCOUNTER SYMPTOMS
BACK PAIN: 1
HEMATURIA: 0
ABDOMINAL PAIN: 0
NUMBNESS: 1
CHILLS: 1
DIAPHORESIS: 1
DYSURIA: 0
FEVER: 0

## 2022-08-25 ASSESSMENT — ACTIVITIES OF DAILY LIVING (ADL)
ADLS_ACUITY_SCORE: 35
ADLS_ACUITY_SCORE: 35

## 2022-08-25 NOTE — ED NOTES
Rapid Assessment Note    History:   Gualberto Smith is a 57 year old male who presents with groin pain that has been ongoing since late June. This pain had been increasing in frequency since it started and he says the groin pain is between his scrotum and his rectal area.  It is a piercing pain.  He was then seen 6 days ago and had an xray done and was given steroids, which helped his pain greatly, at least initially.  4 days ago, he then developed this severe back pain. MRI was then obtained 2 days ago and showed no acute findings and he was then sent here for rule out of infectious causes.  The MRI was done at [facility].  The patient has no definitive history of prostatitis.  He has not been having high fevers or chills or sweats.  No definitive urinary symptoms.    Exam:   Pains abdomen is nontender.  He has a very subtle umbilical hernia.  He notes that he has had a detailed exam for inguinal hernias and that is been negative.  The patient's pain is located midway in the BM between the scrotum and the rectum.  I could not examine this area given that we are in the waiting room.  He will need a detailed physical examination of the perineal area and likely a rectal examination to palpate his prostate.    Medical decision making: This patient has had recent back pain and perineal pain.  He will need a detailed physical examination.  He may end up needing advanced imaging of his pelvis based on the physical examination in the main department.  Blood work and urine testing will be initiated at this time.    Plan of Care:   I evaluated the patient and developed an initial plan of care. I discussed this plan and explained that I, or one of my partners, would be returning to complete the evaluation.     I, Gómez Celis, am serving as a scribe to document services personally performed by Dr. Whittaker, based on my observations and the provider's statements to me.    08/25/2022  [employer]    Portions of this medical record were completed by a scribe. UPON MY REVIEW AND AUTHENTICATION BY ELECTRONIC SIGNATURE, this confirms (a) I performed the applicable clinical services, and (b) the record is accurate.      Andre Whittaker MD  08/25/22 1531

## 2022-08-25 NOTE — ED PROVIDER NOTES
"  History   Chief Complaint:  Back Pain and Groin Pain       HPI   Gualberto Smith is a 57 year old male with a history of seborrheic Keratosis who presents with a sharp pain in his perineum which shoots up his back; it has been present for about 2 weeks intermittently. This is accompanied by some intermittent numbness in his right leg. He reports diaphoresis and chills after waking up one night as well. He denies any dysuria, but he does report a \"different sensation\" while urinating; also, his penis has retracted more than usual.  He denies any painful bowel movements, fever, abdominal pain, hematuria,  loss of his control of his bowels or bladder. He is not sexually active, and he does not smoke. He repots drinking about a bottle of wine on the weekends but nothing on the weekdays. He had steroids last Friday which helped with his pain.  He also has had an MRI of his lumbar spine, which is unremarkable per his report and per the orthopedist that saw him.      Review of Systems   Constitutional: Positive for chills and diaphoresis. Negative for fever.   Gastrointestinal: Negative for abdominal pain.   Genitourinary: Positive for testicular pain (perineal pain). Negative for dysuria and hematuria.   Musculoskeletal: Positive for back pain.   Neurological: Positive for numbness.   All other systems reviewed and are negative.      Allergies:  No Known Allergies    Medications:  Acyclovir   Sildenafil     Past Medical History:     Hemangioma Of The Skin  Solar elastosis   Seborrheic Keratosis  Lentigo  ED   Dyschromia   Inguinal hernia   Hemangioma of skin and subcutaneous tissue     Family History:    Mother: lung cancer   Father: cerebrovascular disease, hypertension, respiratory disease   Sister: cancer     Social History:  The patient presents to the ED by means of car.   PCP: Ede Joseph     Physical Exam     Patient Vitals for the past 24 hrs:   BP Temp Temp src Pulse SpO2 Height Weight   08/25/22 1503 (!) " "148/99 98.6  F (37  C) Temporal 93 98 % 1.93 m (6' 4\") 102.1 kg (225 lb)       Physical Exam  Nursing note and vitals reviewed.  Constitutional:  Oriented to person, place, and time. Cooperative.   HENT:   Nose:    Nose normal.   Mouth/Throat:   Mucous membranes are normal.   Eyes:    Conjunctivae normal and EOM are normal.      Pupils are equal, round, and reactive to light.   Neck:    Trachea normal.   Cardiovascular:  Normal rate, regular rhythm, normal heart sounds and normal pulses. No murmur heard.  Pulmonary/Chest:  Effort normal and breath sounds normal.   Abdominal:   Soft. Normal appearance and bowel sounds are normal.        There is no tenderness.      There is no rebound and no CVA tenderness.   :   Normal circumcised penis and testicles without any lesions or discharge.  No testicular tenderness to palpation.  The perineal region is normal in appearance but there is some reproducible tenderness to palpation in that region.  Rectal exam his unremarkable, although I was not able to fully palpate his prostate.  Musculoskeletal:  Extremities atraumatic x 4.   Lymphadenopathy:  No cervical adenopathy.   Neurological:   Alert and oriented to person, place, and time. Normal strength.      No cranial nerve deficit or sensory deficit. GCS eye subscore is 4. GCS verbal subscore is 5. GCS motor subscore is 6.   Skin:    Skin is intact. No rash noted.   Psychiatric:   Normal mood and affect.      Emergency Department Course     Imaging:  CT Abdomen Pelvis w Contrast   Final Result   IMPRESSION:    1.  Scattered diverticulosis. No diverticulitis, colitis, obstruction, or appendicitis.   2.  Atherosclerotic vascular disease.        Report per radiology    Laboratory:  Labs Ordered and Resulted from Time of ED Arrival to Time of ED Departure   BASIC METABOLIC PANEL - Abnormal       Result Value    Sodium 135      Potassium 3.9      Chloride 101      Carbon Dioxide (CO2) 29      Anion Gap 5      Urea Nitrogen 19   "    Creatinine 1.17      Calcium 9.3      Glucose 101 (*)     GFR Estimate 72     CBC WITH PLATELETS AND DIFFERENTIAL - Abnormal    WBC Count 9.0      RBC Count 5.93 (*)     Hemoglobin 16.2      Hematocrit 50.9      MCV 86      MCH 27.3      MCHC 31.8      RDW 13.3      Platelet Count 180      % Neutrophils 72      % Lymphocytes 18      % Monocytes 8      % Eosinophils 1      % Basophils 0      % Immature Granulocytes 1      NRBCs per 100 WBC 0      Absolute Neutrophils 6.5      Absolute Lymphocytes 1.6      Absolute Monocytes 0.7      Absolute Eosinophils 0.1      Absolute Basophils 0.0      Absolute Immature Granulocytes 0.1      Absolute NRBCs 0.0     UA MACROSCOPIC WITH REFLEX TO MICRO AND CULTURE - Normal    Color Urine Straw      Appearance Urine Clear      Glucose Urine Negative      Bilirubin Urine Negative      Ketones Urine Negative      Specific Gravity Urine 1.009      Blood Urine Negative      pH Urine 5.5      Protein Albumin Urine Negative      Urobilinogen Urine Normal      Nitrite Urine Negative      Leukocyte Esterase Urine Negative     CRP INFLAMMATION - Normal    CRP Inflammation <2.9     HEPATIC FUNCTION PANEL - Normal    Bilirubin Total 0.5      Bilirubin Direct 0.1      Protein Total 7.2      Albumin 3.7      Alkaline Phosphatase 66      AST 15      ALT 39          Emergency Department Course:      Reviewed:  I reviewed nursing notes, vitals, past medical history and Care Everywhere    Assessments:  1710 I obtained history and examined the patient as noted above.   1918 I rechecked the patient and explained findings of CT imaging.     Disposition:  The patient was discharged to home.     Impression & Plan     Medical Decision Making:  This is a 57-year-old male who came in for further evaluation of sharp pains from his perineal region.  He has no signs externally of any type of infection.  He also does not appear septic or toxic.  I considered the possibility of a kidney stone or bladder  infection, but his UA and his CT scan are unremarkable. He also does not have any signs of an abscess or mass on the CT scan.  His symptoms do not seem consistent with prostatitis either, and again his urine is unremarkable as well.  I indicated that this could be nerve pain based on how he is describing his symptoms.  I think it is reasonable for him to try gabapentin.  I am also providing him the contact information for the colorectal surgery clinic and urology.  I also recommended he follow-up with his primary physician to discuss things further as well.  He knows to return here with any concerns or worsening symptoms.    Diagnosis:    ICD-10-CM    1. Inguinal pain, unspecified laterality  R10.30    2. Perineal pain in male  R10.2        Discharge Medications:  Discharge Medication List as of 8/25/2022  7:39 PM      START taking these medications    Details   gabapentin (NEURONTIN) 300 MG capsule Take 1 capsule (300 mg) by mouth 3 times daily for 14 days, Disp-42 capsule, R-0, Local Print             Scribe Disclosure:  I, Joshua Kjer, am serving as a scribe at 5:10 PM on 8/25/2022 to document services personally performed by Quintin Simms MD based on my observations and the provider's statements to me.            Quintin Simms MD  08/25/22 [id number]

## 2022-08-25 NOTE — ED TRIAGE NOTES
Pt reports low back and groin pain for several weeks and has been working with ortho for this with testing (including MRI) showing this is not his back. Sent here for further testing for possible infection.      Triage Assessment     Row Name 08/25/22 1521       Triage Assessment (Adult)    Airway WDL WDL       Respiratory WDL    Respiratory WDL WDL       Skin Circulation/Temperature WDL    Skin Circulation/Temperature WDL WDL       Cardiac WDL    Cardiac WDL WDL       Peripheral/Neurovascular WDL    Peripheral Neurovascular WDL WDL       Cognitive/Neuro/Behavioral WDL    Cognitive/Neuro/Behavioral WDL WDL

## 2022-08-29 ENCOUNTER — MYC MEDICAL ADVICE (OUTPATIENT)
Dept: INTERNAL MEDICINE | Facility: CLINIC | Age: 57
End: 2022-08-29

## 2022-08-30 DIAGNOSIS — G62.9 NEUROPATHY: Primary | ICD-10-CM

## 2022-08-30 DIAGNOSIS — B02.29 POSTHERPETIC NEURALGIA: ICD-10-CM

## 2022-08-30 RX ORDER — TOPIRAMATE 25 MG/1
25 TABLET, FILM COATED ORAL 2 TIMES DAILY
Qty: 60 TABLET | Refills: 11 | Status: SHIPPED | OUTPATIENT
Start: 2022-08-30 | End: 2022-09-09

## 2022-09-09 ENCOUNTER — OFFICE VISIT (OUTPATIENT)
Dept: INTERNAL MEDICINE | Facility: CLINIC | Age: 57
End: 2022-09-09
Payer: COMMERCIAL

## 2022-09-09 VITALS
BODY MASS INDEX: 27.84 KG/M2 | HEART RATE: 72 BPM | OXYGEN SATURATION: 98 % | DIASTOLIC BLOOD PRESSURE: 80 MMHG | WEIGHT: 228.7 LBS | SYSTOLIC BLOOD PRESSURE: 144 MMHG

## 2022-09-09 DIAGNOSIS — K59.4 PROCTALGIA FUGAX: Primary | ICD-10-CM

## 2022-09-09 DIAGNOSIS — Z87.898 HISTORY OF SYNCOPE: ICD-10-CM

## 2022-09-09 DIAGNOSIS — Z12.11 SCREEN FOR COLON CANCER: ICD-10-CM

## 2022-09-09 DIAGNOSIS — F41.1 GENERALIZED ANXIETY DISORDER: ICD-10-CM

## 2022-09-09 DIAGNOSIS — G62.9 NEUROPATHY: ICD-10-CM

## 2022-09-09 DIAGNOSIS — R42 VERTIGO: ICD-10-CM

## 2022-09-09 LAB
ERYTHROCYTE [SEDIMENTATION RATE] IN BLOOD BY WESTERGREN METHOD: 7 MM/HR (ref 0–15)
TSH SERPL DL<=0.005 MIU/L-ACNC: 2.89 UIU/ML (ref 0.3–4.2)
URATE SERPL-MCNC: 6.7 MG/DL (ref 3.4–7)

## 2022-09-09 PROCEDURE — 85652 RBC SED RATE AUTOMATED: CPT | Performed by: INTERNAL MEDICINE

## 2022-09-09 PROCEDURE — 90682 RIV4 VACC RECOMBINANT DNA IM: CPT | Performed by: INTERNAL MEDICINE

## 2022-09-09 PROCEDURE — 99215 OFFICE O/P EST HI 40 MIN: CPT | Mod: 25 | Performed by: INTERNAL MEDICINE

## 2022-09-09 PROCEDURE — 36415 COLL VENOUS BLD VENIPUNCTURE: CPT | Performed by: INTERNAL MEDICINE

## 2022-09-09 PROCEDURE — 99417 PROLNG OP E/M EACH 15 MIN: CPT | Performed by: INTERNAL MEDICINE

## 2022-09-09 PROCEDURE — 82607 VITAMIN B-12: CPT | Performed by: INTERNAL MEDICINE

## 2022-09-09 PROCEDURE — 90471 IMMUNIZATION ADMIN: CPT | Performed by: INTERNAL MEDICINE

## 2022-09-09 PROCEDURE — 84550 ASSAY OF BLOOD/URIC ACID: CPT | Performed by: INTERNAL MEDICINE

## 2022-09-09 PROCEDURE — 84443 ASSAY THYROID STIM HORMONE: CPT | Performed by: INTERNAL MEDICINE

## 2022-09-09 RX ORDER — TOPIRAMATE 25 MG/1
25 TABLET, FILM COATED ORAL 2 TIMES DAILY PRN
Qty: 60 TABLET | Refills: 11 | Status: SHIPPED | OUTPATIENT
Start: 2022-09-09

## 2022-09-09 RX ORDER — DULOXETIN HYDROCHLORIDE 20 MG/1
20 CAPSULE, DELAYED RELEASE ORAL DAILY
Qty: 90 CAPSULE | Refills: 3 | Status: SHIPPED | OUTPATIENT
Start: 2022-09-09 | End: 2023-09-07

## 2022-09-09 RX ORDER — PREDNISONE 10 MG/1
10-20 TABLET ORAL EVERY MORNING
Qty: 6 TABLET | Refills: 0 | Status: SHIPPED | OUTPATIENT
Start: 2022-09-09 | End: 2022-09-12

## 2022-09-09 ASSESSMENT — ANXIETY QUESTIONNAIRES
6. BECOMING EASILY ANNOYED OR IRRITABLE: SEVERAL DAYS
GAD7 TOTAL SCORE: 7
3. WORRYING TOO MUCH ABOUT DIFFERENT THINGS: SEVERAL DAYS
7. FEELING AFRAID AS IF SOMETHING AWFUL MIGHT HAPPEN: SEVERAL DAYS
2. NOT BEING ABLE TO STOP OR CONTROL WORRYING: SEVERAL DAYS
8. IF YOU CHECKED OFF ANY PROBLEMS, HOW DIFFICULT HAVE THESE MADE IT FOR YOU TO DO YOUR WORK, TAKE CARE OF THINGS AT HOME, OR GET ALONG WITH OTHER PEOPLE?: SOMEWHAT DIFFICULT
GAD7 TOTAL SCORE: 7
7. FEELING AFRAID AS IF SOMETHING AWFUL MIGHT HAPPEN: SEVERAL DAYS
GAD7 TOTAL SCORE: 7
5. BEING SO RESTLESS THAT IT IS HARD TO SIT STILL: SEVERAL DAYS
4. TROUBLE RELAXING: SEVERAL DAYS
IF YOU CHECKED OFF ANY PROBLEMS ON THIS QUESTIONNAIRE, HOW DIFFICULT HAVE THESE PROBLEMS MADE IT FOR YOU TO DO YOUR WORK, TAKE CARE OF THINGS AT HOME, OR GET ALONG WITH OTHER PEOPLE: SOMEWHAT DIFFICULT
1. FEELING NERVOUS, ANXIOUS, OR ON EDGE: SEVERAL DAYS

## 2022-09-09 NOTE — PATIENT INSTRUCTIONS
Goal blood pressure age 57 is around 130 top, plus or minus 15 points (115-145) and below 90 bottom.  Pulse whatever    Monitor blood pressure    Proctalgia fugax??  Blood tests and colon    Records from [facility], and reports of xrays and MRI    Spells can be fast heart rate, low blood pressure, migraine, seizures, low blood sugar, inner ear vertigo.  Vertigo is neurologic, and light headed is circulation    Old records of colonoscopy, and update colonoscopy    Duloxetine 20 mgs daily for anxiety, depression, nerve pain    The rash from the gabapentin could have been shingles    Heart monitor to look for heart rhythm issues is the most straightforward    Hold on colo rectal surgeon    Prednisone 10-20 mgs as needed for attacks of pain

## 2022-09-09 NOTE — PROGRESS NOTES
ASSESSMENT and PLAN:  1. Proctalgia fugax  Unclear etiology.  Provisional diagnosis.  Check neuropathy labs.  Provide prednisone.  Consider intermittent use of topiramate.  Consider urology.  Update colonoscopy  - TSH; Future  - Vitamin B12; Future  - Erythrocyte sedimentation rate auto; Future  - predniSONE (DELTASONE) 10 MG tablet; Take 1-2 tablets (10-20 mg) by mouth every morning for 3 days  Dispense: 6 tablet; Refill: 0  - TSH  - Vitamin B12  - Erythrocyte sedimentation rate auto    2. Neuropathy  Check labs.  Trial of duloxetine.  Use topiramate as needed  - TSH; Future  - Vitamin B12; Future  - Uric acid; Future  - DULoxetine (CYMBALTA) 20 MG capsule; Take 1 capsule (20 mg) by mouth daily  Dispense: 90 capsule; Refill: 3  - predniSONE (DELTASONE) 10 MG tablet; Take 1-2 tablets (10-20 mg) by mouth every morning for 3 days  Dispense: 6 tablet; Refill: 0  - topiramate (TOPAMAX) 25 MG tablet; Take 1 tablet (25 mg) by mouth 2 times daily as needed (nerve pain)  Dispense: 60 tablet; Refill: 11  - TSH  - Vitamin B12  - Uric acid    3. Screen for colon cancer  Due for updated due to polyps  - Colonoscopy Screening  Referral; Future    4. History of syncope  Unclear whether inner ear vertiginous, central nervous, or cardiac.  Begin with cardiac monitor but discussed other etiologies  - Cardiac Event Monitor Adult/Pediatric; Future    5. Vertigo  - Cardiac Event Monitor Adult/Pediatric; Future    6. Generalized anxiety disorder  Suggestion per family.  Trial of duloxetine for anxiety and effect on neuropathy  - DULoxetine (CYMBALTA) 20 MG capsule; Take 1 capsule (20 mg) by mouth daily  Dispense: 90 capsule; Refill: 3     Preventive Care Assessed: Reviewed and up-to-date.  Update colonoscopy    Patient Instructions   Goal blood pressure age 57 is around 130 top, plus or minus 15 points (115-145) and below 90 bottom.  Pulse whatever    Monitor blood pressure    Proctalgia fugax??  Blood tests and  colon    Records from [facility], and reports of xrays and MRI    Spells can be fast heart rate, low blood pressure, migraine, seizures, low blood sugar, inner ear vertigo.  Vertigo is neurologic, and light headed is circulation    Old records of colonoscopy, and update colonoscopy    Duloxetine 20 mgs daily for anxiety, depression, nerve pain    The rash from the gabapentin could have been shingles    Heart monitor to look for heart rhythm issues is the most straightforward    Hold on colo rectal surgeon    Prednisone 10-20 mgs as needed for attacks of pain             Return in about 6 months (around 3/9/2023) for using a video visit.       CHIEF COMPLAINT:  Chief Complaint   Patient presents with     Follow Up     Last visit        HISTORY OF PRESENT ILLNESS:  Gualberto is a 57 year old male presenting to the clinic today for review.  At her last visit we discussed possible hernias.  He saw surgery who did not feel he had umbilical or inguinal hernias.  CT scan subsequently done showed no hernias    He has had episodes of severe rectal proctoscopy pain in the perineum.  He went to the emergency room for evaluation on 1 occasion.  CT scan and labs showed no evidence of cystitis or diverticulitis.  Episodes typically last just a few minutes.  He also has back pain but this seems different.  No association with urine or bowel movements.  We discussed provisional diagnosis of proctalgia fugax.  He was given a prescription for gabapentin through the emergency room, promptly had a rash on his back and stopped it, contacted me and I provided topiramate.  He did not take this due to fear of side effects.  We discussed that the rash may have been shingles    He is due for colonoscopy after colon polyps    Wife and daughter think he is anxious and should have treat    REVIEW OF SYSTEMS:   No nocturia.  No peripheral edema    PFSH:  Social History     Social History Narrative    , 2 children                 TOBACCO USE:  History   Smoking Status     Never Smoker   Smokeless Tobacco     Never Used       VITALS:  Vitals:    09/09/22 1210 09/09/22 1211   BP: (!) 148/84 (!) 144/80   BP Location: Left arm Left arm   Patient Position: Sitting Sitting   Cuff Size: Adult Regular Adult Large   Pulse: 72    SpO2: 98%    Weight: 103.7 kg (228 lb 11.2 oz)      Wt Readings from Last 3 Encounters:   09/09/22 103.7 kg (228 lb 11.2 oz)   08/25/22 102.1 kg (225 lb)   07/29/22 103 kg (227 lb)     Body mass index is 27.84 kg/m .    PHYSICAL EXAM:  Constitutional:  Reveals pleasant talkative man who ambulates without difficulty    Heart regular rate and rhythm without murmurs rubs or gallops    MEDICATIONS:  Current Outpatient Medications   Medication Sig Dispense Refill     acetaminophen 650 MG TABS Take 650 mg by mouth every 4 hours as needed for mild pain 100 tablet      acyclovir (ZOVIRAX) 400 MG tablet TAKE 1 TABLET BY MOUTH THREE TIMES DAILY for 5 days as needed for cold sore. 20 tablet 1     DULoxetine (CYMBALTA) 20 MG capsule Take 1 capsule (20 mg) by mouth daily 90 capsule 3     multivitamin w/minerals (THERA-VIT-M) tablet Take 1 tablet by mouth daily 100 tablet 3     predniSONE (DELTASONE) 10 MG tablet Take 1-2 tablets (10-20 mg) by mouth every morning for 3 days 6 tablet 0     sildenafil (VIAGRA) 50 MG tablet Take 1 tablet (50 mg) by mouth daily as needed (erectile dysfunction) 30-60 minutes before intercourse 30 tablet 3     topiramate (TOPAMAX) 25 MG tablet Take 1 tablet (25 mg) by mouth 2 times daily as needed (nerve pain) 60 tablet 11         Notes summarized: Emergency room note.  Labs, x-rays, cardiology, GI tests reviewed: CT abdomen.  Labs as below and ordered  Recent Labs   Lab Test 08/25/22  1531 03/11/22  1241 05/21/21  1554   HGB 16.2  --  14.4    139 140   POTASSIUM 3.9 4.8 4.2   CR 1.17 1.00 1.09   A1C  --   --  5.6   PSA  --  0.61 0.42   VITDT  --  11*  --      Lab Results   Component Value Date     CHOL 240 03/11/2022    CHOL 217 05/21/2021     New orders:   Orders Placed This Encounter   Procedures     TX RIV4 (FLUBLOK) VACCINE RECOMBINANT DNA PRSRV ANTIBIO FREE, IM     TSH     Vitamin B12     Uric acid     Erythrocyte sedimentation rate auto     Colonoscopy Screening  Referral     Cardiac Event Monitor Adult/Pediatric       Independent review of:  Supplemental history by:      Start Time: 1:20 PM  End time:  2:16 PM     Answers for HPI/ROS submitted by the patient on 9/9/2022  HELENA 7 TOTAL SCORE: 7  Depression/Anxiety: Anxiety  Anxiety since last: : no change  Other associated symotome: : No  Significant life event: : job concerns, grief or loss, health concerns  Anxious:: Yes  Current substance use:: No  Your back pain is: new  What do you think is the original cause of your back pain?: not sure  When did you first notice your back pain? : more than 1 month ago  How would you describe your back pain? : sharp  How often do you feel your back pain? : daily  Where is your back pain located? : other  Where does your back pain spread? : nowhere  Since you noticed your back pain, how has it changed? : always present, but gets better and worse  Does your back pain interfere with your job?: Not applicable  On a scale of 1-10 (10 being the worst), how strong is your back pain?: 9  What makes your back pain worse? : nothing  Acupuncture:: not tried  Acetaminophen: helpful  Activity or Exercise: helpful  Chiropractor: not tried  Cold: helpful  Heat: helpful  Massage: not tried  Muscle relaxants : not tried  NSAIDS (Ibuprofen, Naproxen) : not tried  Opioids: not tried  Physical Therapy: not tried  Rest: helpful  Steroid Injection: helpful  Stretching : helpful  Surgery: not tried  TENS Unit: not tried  Topical pain relievers : helpful  Do you see any other healthcare providers for your back pain? : Other  How many servings of fruits and vegetables do you eat daily?: 0-1  On average, how many sweetened beverages  do you drink each day (Examples: soda, juice, sweet tea, etc.  Do NOT count diet or artificially sweetened beverages)?: 0  How many minutes a day do you exercise enough to make your heart beat faster?: 10 to 19  How many days a week do you exercise enough to make your heart beat faster?: 7  How many days per week do you miss taking your medication?: 0      Conversation plus orders: 56 minutes  Dictation time:  3 minutes    The visit lasted a total of 59 minutes     Ede Joseph MD

## 2022-09-10 LAB — VIT B12 SERPL-MCNC: 357 PG/ML (ref 232–1245)

## 2022-09-12 DIAGNOSIS — K59.4 PROCTALGIA FUGAX: Primary | ICD-10-CM

## 2022-09-12 RX ORDER — ALLOPURINOL 300 MG/1
300 TABLET ORAL DAILY
Qty: 30 TABLET | Refills: 11 | Status: SHIPPED | OUTPATIENT
Start: 2022-09-12 | End: 2023-09-12

## 2022-09-26 ENCOUNTER — APPOINTMENT (OUTPATIENT)
Dept: ULTRASOUND IMAGING | Facility: CLINIC | Age: 57
End: 2022-09-26
Attending: EMERGENCY MEDICINE
Payer: COMMERCIAL

## 2022-09-26 PROCEDURE — 99285 EMERGENCY DEPT VISIT HI MDM: CPT | Mod: 25

## 2022-09-26 PROCEDURE — 93971 EXTREMITY STUDY: CPT | Mod: RT

## 2022-09-27 ENCOUNTER — HOSPITAL ENCOUNTER (OUTPATIENT)
Facility: CLINIC | Age: 57
Setting detail: OBSERVATION
Discharge: HOME | End: 2022-09-28
Attending: EMERGENCY MEDICINE | Admitting: HOSPITALIST
Payer: COMMERCIAL

## 2022-09-27 DIAGNOSIS — I82.4Y1 ACUTE DEEP VEIN THROMBOSIS (DVT) OF PROXIMAL VEIN OF RIGHT LOWER EXTREMITY (H): ICD-10-CM

## 2022-09-27 DIAGNOSIS — I10 BENIGN ESSENTIAL HYPERTENSION: Primary | ICD-10-CM

## 2022-09-27 LAB
ANION GAP SERPL CALCULATED.3IONS-SCNC: 7 MMOL/L (ref 3–14)
APTT PPP: 27 SECONDS (ref 22–38)
BASOPHILS # BLD AUTO: 0 10E3/UL (ref 0–0.2)
BASOPHILS NFR BLD AUTO: 1 %
BUN SERPL-MCNC: 11 MG/DL (ref 7–30)
CALCIUM SERPL-MCNC: 9.2 MG/DL (ref 8.5–10.1)
CHLORIDE BLD-SCNC: 103 MMOL/L (ref 94–109)
CO2 SERPL-SCNC: 29 MMOL/L (ref 20–32)
CREAT SERPL-MCNC: 0.88 MG/DL (ref 0.66–1.25)
EOSINOPHIL # BLD AUTO: 0.3 10E3/UL (ref 0–0.7)
EOSINOPHIL NFR BLD AUTO: 4 %
ERYTHROCYTE [DISTWIDTH] IN BLOOD BY AUTOMATED COUNT: 12.9 % (ref 10–15)
GFR SERPL CREATININE-BSD FRML MDRD: >90 ML/MIN/1.73M2
GLUCOSE BLD-MCNC: 115 MG/DL (ref 70–99)
HCT VFR BLD AUTO: 44 % (ref 40–53)
HGB BLD-MCNC: 14.2 G/DL (ref 13.3–17.7)
HOLD SPECIMEN: NORMAL
IMM GRANULOCYTES # BLD: 0 10E3/UL
IMM GRANULOCYTES NFR BLD: 1 %
INR PPP: 0.95 (ref 0.85–1.15)
LYMPHOCYTES # BLD AUTO: 2 10E3/UL (ref 0.8–5.3)
LYMPHOCYTES NFR BLD AUTO: 27 %
MCH RBC QN AUTO: 27.7 PG (ref 26.5–33)
MCHC RBC AUTO-ENTMCNC: 32.3 G/DL (ref 31.5–36.5)
MCV RBC AUTO: 86 FL (ref 78–100)
MONOCYTES # BLD AUTO: 0.6 10E3/UL (ref 0–1.3)
MONOCYTES NFR BLD AUTO: 9 %
NEUTROPHILS # BLD AUTO: 4.4 10E3/UL (ref 1.6–8.3)
NEUTROPHILS NFR BLD AUTO: 58 %
NRBC # BLD AUTO: 0 10E3/UL
NRBC BLD AUTO-RTO: 0 /100
PLATELET # BLD AUTO: 195 10E3/UL (ref 150–450)
POTASSIUM BLD-SCNC: 3.9 MMOL/L (ref 3.4–5.3)
RBC # BLD AUTO: 5.13 10E6/UL (ref 4.4–5.9)
SARS-COV-2 RNA RESP QL NAA+PROBE: NEGATIVE
SODIUM SERPL-SCNC: 139 MMOL/L (ref 133–144)
UFH PPP CHRO-ACNC: 0.48 IU/ML
UFH PPP CHRO-ACNC: 0.97 IU/ML
WBC # BLD AUTO: 7.3 10E3/UL (ref 4–11)

## 2022-09-27 PROCEDURE — 36415 COLL VENOUS BLD VENIPUNCTURE: CPT | Performed by: EMERGENCY MEDICINE

## 2022-09-27 PROCEDURE — U0005 INFEC AGEN DETEC AMPLI PROBE: HCPCS | Performed by: EMERGENCY MEDICINE

## 2022-09-27 PROCEDURE — 250N000013 HC RX MED GY IP 250 OP 250 PS 637: Performed by: HOSPITALIST

## 2022-09-27 PROCEDURE — 96366 THER/PROPH/DIAG IV INF ADDON: CPT

## 2022-09-27 PROCEDURE — 99222 1ST HOSP IP/OBS MODERATE 55: CPT | Mod: AI | Performed by: HOSPITALIST

## 2022-09-27 PROCEDURE — 85610 PROTHROMBIN TIME: CPT | Performed by: EMERGENCY MEDICINE

## 2022-09-27 PROCEDURE — C9803 HOPD COVID-19 SPEC COLLECT: HCPCS

## 2022-09-27 PROCEDURE — 250N000011 HC RX IP 250 OP 636: Performed by: EMERGENCY MEDICINE

## 2022-09-27 PROCEDURE — 99207 PR NO BILLABLE SERVICE THIS VISIT: CPT | Performed by: HOSPITALIST

## 2022-09-27 PROCEDURE — 85730 THROMBOPLASTIN TIME PARTIAL: CPT | Performed by: EMERGENCY MEDICINE

## 2022-09-27 PROCEDURE — 120N000001 HC R&B MED SURG/OB

## 2022-09-27 PROCEDURE — 85520 HEPARIN ASSAY: CPT | Performed by: EMERGENCY MEDICINE

## 2022-09-27 PROCEDURE — 96365 THER/PROPH/DIAG IV INF INIT: CPT

## 2022-09-27 PROCEDURE — 96376 TX/PRO/DX INJ SAME DRUG ADON: CPT

## 2022-09-27 PROCEDURE — 80048 BASIC METABOLIC PNL TOTAL CA: CPT | Performed by: EMERGENCY MEDICINE

## 2022-09-27 PROCEDURE — 85025 COMPLETE CBC W/AUTO DIFF WBC: CPT | Performed by: EMERGENCY MEDICINE

## 2022-09-27 RX ORDER — OMEGA-3 FATTY ACIDS/FISH OIL 300-1000MG
800 CAPSULE ORAL EVERY MORNING
Status: ON HOLD | COMMUNITY
End: 2022-09-28

## 2022-09-27 RX ORDER — ONDANSETRON 4 MG/1
4 TABLET, ORALLY DISINTEGRATING ORAL EVERY 6 HOURS PRN
Status: DISCONTINUED | OUTPATIENT
Start: 2022-09-27 | End: 2022-09-28 | Stop reason: HOSPADM

## 2022-09-27 RX ORDER — ACETAMINOPHEN 650 MG/1
650 SUPPOSITORY RECTAL EVERY 6 HOURS PRN
Status: DISCONTINUED | OUTPATIENT
Start: 2022-09-27 | End: 2022-09-28 | Stop reason: HOSPADM

## 2022-09-27 RX ORDER — HEPARIN SODIUM 10000 [USP'U]/100ML
0-5000 INJECTION, SOLUTION INTRAVENOUS CONTINUOUS
Status: DISCONTINUED | OUTPATIENT
Start: 2022-09-27 | End: 2022-09-28

## 2022-09-27 RX ORDER — HYDRALAZINE HYDROCHLORIDE 10 MG/1
10 TABLET, FILM COATED ORAL EVERY 6 HOURS PRN
Status: DISCONTINUED | OUTPATIENT
Start: 2022-09-27 | End: 2022-09-28 | Stop reason: HOSPADM

## 2022-09-27 RX ORDER — DULOXETIN HYDROCHLORIDE 20 MG/1
20 CAPSULE, DELAYED RELEASE ORAL DAILY
Status: CANCELLED | OUTPATIENT
Start: 2022-09-27

## 2022-09-27 RX ORDER — OMEGA-3 FATTY ACIDS/FISH OIL 300-1000MG
600 CAPSULE ORAL EVERY EVENING
Status: ON HOLD | COMMUNITY
End: 2022-09-28

## 2022-09-27 RX ORDER — FINASTERIDE 1 MG/1
1 TABLET, FILM COATED ORAL DAILY
COMMUNITY

## 2022-09-27 RX ORDER — LANOLIN ALCOHOL/MO/W.PET/CERES
3 CREAM (GRAM) TOPICAL
Status: DISCONTINUED | OUTPATIENT
Start: 2022-09-27 | End: 2022-09-28 | Stop reason: HOSPADM

## 2022-09-27 RX ORDER — ONDANSETRON 2 MG/ML
4 INJECTION INTRAMUSCULAR; INTRAVENOUS EVERY 6 HOURS PRN
Status: DISCONTINUED | OUTPATIENT
Start: 2022-09-27 | End: 2022-09-28 | Stop reason: HOSPADM

## 2022-09-27 RX ORDER — AMLODIPINE BESYLATE 5 MG/1
5 TABLET ORAL DAILY
Status: DISCONTINUED | OUTPATIENT
Start: 2022-09-27 | End: 2022-09-28

## 2022-09-27 RX ORDER — ACETAMINOPHEN 325 MG/1
650 TABLET ORAL EVERY 6 HOURS PRN
Status: DISCONTINUED | OUTPATIENT
Start: 2022-09-27 | End: 2022-09-28 | Stop reason: HOSPADM

## 2022-09-27 RX ADMIN — HEPARIN SODIUM 1800 UNITS/HR: 10000 INJECTION, SOLUTION INTRAVENOUS at 00:55

## 2022-09-27 RX ADMIN — HEPARIN SODIUM 1600 UNITS/HR: 10000 INJECTION, SOLUTION INTRAVENOUS at 08:26

## 2022-09-27 RX ADMIN — ACETAMINOPHEN 650 MG: 325 TABLET, FILM COATED ORAL at 18:02

## 2022-09-27 RX ADMIN — HEPARIN SODIUM 16 UNITS/HR: 10000 INJECTION, SOLUTION INTRAVENOUS at 10:10

## 2022-09-27 RX ADMIN — AMLODIPINE BESYLATE 5 MG: 5 TABLET ORAL at 10:10

## 2022-09-27 ASSESSMENT — ACTIVITIES OF DAILY LIVING (ADL)
DRESSING/BATHING_DIFFICULTY: NO
WEAR_GLASSES_OR_BLIND: YES
CONCENTRATING,_REMEMBERING_OR_MAKING_DECISIONS_DIFFICULTY: NO
ADLS_ACUITY_SCORE: 35
ADLS_ACUITY_SCORE: 20
WALKING_OR_CLIMBING_STAIRS_DIFFICULTY: NO
ADLS_ACUITY_SCORE: 35
FALL_HISTORY_WITHIN_LAST_SIX_MONTHS: NO
CHANGE_IN_FUNCTIONAL_STATUS_SINCE_ONSET_OF_CURRENT_ILLNESS/INJURY: NO
ADLS_ACUITY_SCORE: 20
ADLS_ACUITY_SCORE: 20
ADLS_ACUITY_SCORE: 35
DIFFICULTY_EATING/SWALLOWING: NO
TOILETING_ISSUES: NO
DIFFICULTY_COMMUNICATING: NO
ADLS_ACUITY_SCORE: 20
ADLS_ACUITY_SCORE: 35
ADLS_ACUITY_SCORE: 20
DOING_ERRANDS_INDEPENDENTLY_DIFFICULTY: NO

## 2022-09-27 ASSESSMENT — ENCOUNTER SYMPTOMS
FEVER: 0
COUGH: 0
SHORTNESS OF BREATH: 1

## 2022-09-27 NOTE — PHARMACY-ADMISSION MEDICATION HISTORY
Pharmacy Medication History  Admission medication history interview status for the 9/27/2022  admission is complete. See EPIC admission navigator for prior to admission medications     Location of Interview: Patient room  Medication history sources: Patient, Patient's family/friend (spouse at bedside), Surescripts and Care Everywhere    Significant changes made to the medication list:  Added finasteride, ibuprofen, fish oil, vitamin E   Removed acetaminophen 650 mg q4h PRN (patient states taking ibuprofen instead)     In the past week, patient estimated taking medication this percent of the time: greater than 90% - patient was unfamiliar with allopurinol when asked about medication during interview, recently prescribed 9/12/22 so may not have yet started. Kept on PTA med list.     Additional medication history information:   Patient is a reliable historian.     Medication reconciliation completed by provider prior to medication history? No    Time spent in this activity: 15 minutes    Medication Sig Last Dose Taking? Auth Provider   acyclovir (ZOVIRAX) 400 MG tablet TAKE 1 TABLET BY MOUTH THREE TIMES DAILY for 5 days as needed for cold sore. More than a month at PRN Yes Ede Joseph MD   allopurinol (ZYLOPRIM) 300 MG tablet Take 1 tablet (300 mg) by mouth daily Unknown at unsure if taking Yes Ede Joseph MD   DULoxetine (CYMBALTA) 20 MG capsule Take 1 capsule (20 mg) by mouth daily 9/26/2022 at AM Yes Ede Joseph MD   finasteride (PROPECIA) 1 MG tablet Take 1 mg by mouth daily Keeps brand, for hair growth 9/26/2022 at AM Yes Unknown, Entered By History   ibuprofen (ADVIL/MOTRIN) 200 MG capsule Take 800 mg by mouth every morning Unknown at Unknown time Yes Unknown, Entered By History   ibuprofen (ADVIL/MOTRIN) 200 MG capsule Take 600 mg by mouth every evening Unknown at Unknown time Yes Unknown, Entered By History   multivitamin w/minerals (THERA-VIT-M) tablet Take 1 tablet by mouth daily  More than a month at takes when remembers Yes Wegener, Joel Daniel Irwin, MD   Omega-3 Fatty Acids (FISH OIL PO) Take 1 capsule by mouth daily Unspecified capsule strength Unknown at Unknown time Yes Unknown, Entered By History   sildenafil (VIAGRA) 50 MG tablet Take 1 tablet (50 mg) by mouth daily as needed (erectile dysfunction) 30-60 minutes before intercourse Unknown at none last 48 hours Yes Ede Joseph MD   topiramate (TOPAMAX) 25 MG tablet Take 1 tablet (25 mg) by mouth 2 times daily as needed (nerve pain)  at has home supply, has not taken yet Yes Ede Joseph MD   VITAMIN E PO Take 1 capsule by mouth daily Unspecified capsule strength Unknown at Unknown time Yes Unknown, Entered By History       The information provided in this note is only as accurate as the sources available at the time of update(s)   Rochelle Feliciano, PeterD

## 2022-09-27 NOTE — PROGRESS NOTES
RECEIVING UNIT ED HANDOFF REVIEW    ED Nurse Handoff Report was reviewed by: Eugenia Walden RN on September 27, 2022 at 1:53 PM

## 2022-09-27 NOTE — CONSULTS
Patient has [employer] through an employer.    Xarelto/Eliquis:  $30/mo. Upon receipt of RX Discharge [facility] can provide copay savings card from [employer] or [employer], respectively, to reduce this to $10/mo.    Enoxaparin 100mg x 10 syringes: $15.    Jantoven (warfarin): $15/mo.      -MARC Morris, Pharmacy Technician/Liaison, Discharge Pharmacy [phone]

## 2022-09-27 NOTE — H&P
"[facility]    History and Physical  Hospitalist     Date of Admission:  9/27/2022    Assessment & Plan   Gualberto Smith is a 57 year old male with a past medical history of anxiety and neuropathy presenting the hospital with DVT.    Right lower extremity DVT  Patient presenting with 1 week history of right lower extremity swelling and pain.  On to have extensive DVT involving the right common femoral vein extending into the posterior tibial vein peroneal vein and gastrin anemias veins.  Occlusive in the mid superficial femoral vein.  Circulation appears to be intact on exam, with intact capillary refill in the toes.  Approximately 1 month ago the patient was immobilized for about a week due to back pain which is likely the cause of his DVT.  Never had a DVT in the past.  He denies any trauma to the leg.  -Continue with heparin drip  -Follow-up IR consult for possible thrombectomy  -Outpatient follow-up with vascular medicine    Hypertension  Patient with elevated blood pressure readings in the emergency room, he has no past diagnosis of hypertension.  -Hydralazine as needed for SBP greater than 180    Chronic medical conditions- resume PTA meds once med rec is complete  Anxiety  Neuropathy      Code Status   Full Code  DVT ppx: Heparin drip  Expected length of stay less than 2 days    Clinically Significant Risk Factors Present on Admission                    # Overweight: Estimated body mass index is 27.63 kg/m  as calculated from the following:    Height as of this encounter: 1.93 m (6' 4\").    Weight as of this encounter: 103 kg (227 lb).            Primary Care Physician   Ede Joseph    Chief Complaint   Leg Swelling    History obtained from the patient    History of Present Illness   Gualberto Smith is a 57 year old male with a past medical history of neuropathy and anxiety presents to hospital with right lower extremity swelling and pain.  The patient reports that the symptoms " started approximately 1 week prior to presentation.  He decided to wait a few days however his symptoms did not improve so he decided to come to the hospital.  Of note the patient had an episode of severe back pain approximately 1 month ago during which he was immobile mostly lying in bed for approximately 1 week.  Patient also has recent travel to [place] on September 22 however this trip was after his right leg swelling and pain started.  The patient has never had a blood clot in the past.  He denies any trauma to the leg.  He denies any recent illnesses.    Past Medical History    I have reviewed this patient's medical history and updated it with pertinent information if needed.   Past Medical History:   Diagnosis Date     NO ACTIVE PROBLEMS        Past Surgical History   I have reviewed this patient's surgical history and updated it with pertinent information if needed.  Past Surgical History:   Procedure Laterality Date     NO HISTORY OF SURGERY         Prior to Admission Medications   Prior to Admission Medications   Prescriptions Last Dose Informant Patient Reported? Taking?   DULoxetine (CYMBALTA) 20 MG capsule   No No   Sig: Take 1 capsule (20 mg) by mouth daily   acetaminophen 650 MG TABS   No No   Sig: Take 650 mg by mouth every 4 hours as needed for mild pain   acyclovir (ZOVIRAX) 400 MG tablet   No No   Sig: TAKE 1 TABLET BY MOUTH THREE TIMES DAILY for 5 days as needed for cold sore.   allopurinol (ZYLOPRIM) 300 MG tablet   No No   Sig: Take 1 tablet (300 mg) by mouth daily   multivitamin w/minerals (THERA-VIT-M) tablet   Yes No   Sig: Take 1 tablet by mouth daily   sildenafil (VIAGRA) 50 MG tablet   No No   Sig: Take 1 tablet (50 mg) by mouth daily as needed (erectile dysfunction) 30-60 minutes before intercourse   topiramate (TOPAMAX) 25 MG tablet   No No   Sig: Take 1 tablet (25 mg) by mouth 2 times daily as needed (nerve pain)      Facility-Administered Medications: None     Allergies   Allergies    Allergen Reactions     Gabapentin Rash       Social History   I have reviewed this patient's social history and updated it with pertinent information if needed. Gualberto Smith  reports that he has never smoked. He has never used smokeless tobacco. He reports current alcohol use. He reports that he does not use drugs.    Family History   I have reviewed this patient's family history and updated it with pertinent information if needed.   Family History   Problem Relation Age of Onset     Cancer Mother         lung,brain     Cerebrovascular Disease Father         70's     Hypertension Father      Respiratory Father      Heart Disease Maternal Grandfather      Cancer Sister         two of his sisters diagnosed with skin cancer-melanoma       Review of Systems   The 10 point Review of Systems is negative other than noted in the HPI or here.     Physical Exam   Temp: 97.9  F (36.6  C) Temp src: Temporal BP: (!) 193/108 Pulse: 71   Resp: 16 SpO2: 96 % O2 Device: None (Room air)    Vital Signs with Ranges  Temp:  [97.9  F (36.6  C)] 97.9  F (36.6  C)  Pulse:  [71-73] 71  Resp:  [16] 16  BP: (184-193)/() 193/108  SpO2:  [96 %] 96 %  227 lbs 0 oz  Physical Exam  Vitals reviewed.   Constitutional:       Appearance: Normal appearance.      Comments: Pleasant well-developed well-nourished man seen resting bed comfortably no apparent distress.   HENT:      Head: Normocephalic and atraumatic.      Mouth/Throat:      Mouth: Mucous membranes are moist.      Pharynx: Oropharynx is clear.   Eyes:      Extraocular Movements: Extraocular movements intact.      Conjunctiva/sclera: Conjunctivae normal.      Pupils: Pupils are equal, round, and reactive to light.   Cardiovascular:      Rate and Rhythm: Normal rate and regular rhythm.      Pulses: Normal pulses.      Heart sounds: Normal heart sounds. No murmur heard.  Pulmonary:      Effort: Pulmonary effort is normal.      Breath sounds: Normal breath sounds. No wheezing, rhonchi  or rales.   Abdominal:      General: Abdomen is flat. Bowel sounds are normal.      Palpations: Abdomen is soft. There is no mass.      Tenderness: There is no abdominal tenderness. There is no guarding.   Musculoskeletal:         General: Swelling present. Normal range of motion.      Cervical back: Normal range of motion and neck supple.      Comments: Right lower extremity swelling greater than left.  2+ pitting edema.  Capillary refill intact in right lower extremity.  Leg is warm to the touch.   Skin:     General: Skin is warm and dry.   Neurological:      General: No focal deficit present.      Mental Status: He is alert and oriented to person, place, and time. Mental status is at baseline.      Cranial Nerves: No cranial nerve deficit.

## 2022-09-27 NOTE — ED NOTES
Pt up to restroom, steady gait, denies pain. Once Pt back in room lights dimmed, call light at bedside, and Pt given warm blankets.

## 2022-09-27 NOTE — PLAN OF CARE
Patient alert x 4, arrived from ED, Right DVT, on Hep GTT 1600, right leg swollen, warm , and tender, elevated on pillows. Patient up in room indep, needs assit to unplug IV but no bed alarm. Scores green on aggression tool, discharge home when stable.

## 2022-09-27 NOTE — CONSULTS
Interventional Radiology - Pre-Procedure Note:  9/27/2022    Procedure Requested: Possible thrombectomy, patient presenting with right leg pain and swelling found to have dvt with large clott burden  Requested by: Dr Rodrigues    Brief HPI: Gualberto Smith is a 57 year old male who presented to the ED for evaluation of leg swelling. On 9/22/22 he drove down to [place] and returned 9/26. While driving he would typically sit for 4.5 hours at a time. He noticed new swelling and pain to his right lower leg prior to the trip but since then it has been worsening. IR consult placed for possible thrombectomy.     Gaulberto was seen in the ED with his wife Glory. He states his right leg swelling has improved since starting IV heparin. He had pain and tightness in his right calf initially which is improving also. Occasional discomfort on his lateral right thigh along with some numbness and tingling in his right foot which is better since the swelling has decreased. He has chronic right toe numbness which is not new. Gualberto has no history of DVT or blood clots. Recent possible precipitating factors are decrease in physical activity and bedrest for a bit since having issues with back pain in August.     IMAGING:  EXAM: US LOWER EXTREMITY VENOUS DUPLEX RIGHT  LOCATION: [facility]  DATE/TIME: 9/26/2022 8:23 PM     INDICATION: leg swelling  COMPARISON: None.  TECHNIQUE: Venous Duplex ultrasound of the right lower extremity with and without compression, augmentation and duplex. Color flow and spectral Doppler with waveform analysis performed.     FINDINGS: Exam includes the common femoral, femoral, popliteal, and contralateral common femoral veins as well as segmentally visualized deep calf veins and greater saphenous vein.      RIGHT: Nonocclusive clot identified in the right common femoral vein which becomes occlusive in the mid superficial femoral vein extending into the posterior tibial vein, peroneal vein, and  gastrocnemius veins. The external iliac vein appears to be   patent. No superficial thrombophlebitis. No popliteal cyst.                                                            IMPRESSION:  1.  Extensive DVT identified within the right lower extremity.     2.  These findings were discussed with Dr. Baird at the time of this dictation.    NPO: No  ANTICOAGULANTS: IV Heparin     Prior to Admission Medications   Prescriptions Last Dose Informant Patient Reported? Taking?   DULoxetine (CYMBALTA) 20 MG capsule 9/26/2022 at AM Self No Yes   Sig: Take 1 capsule (20 mg) by mouth daily   Omega-3 Fatty Acids (FISH OIL PO) Unknown at Unknown time Self Yes Yes   Sig: Take 1 capsule by mouth daily Unspecified capsule strength   VITAMIN E PO Unknown at Unknown time Self Yes Yes   Sig: Take 1 capsule by mouth daily Unspecified capsule strength   acyclovir (ZOVIRAX) 400 MG tablet More than a month at PRN Self No Yes   Sig: TAKE 1 TABLET BY MOUTH THREE TIMES DAILY for 5 days as needed for cold sore.   allopurinol (ZYLOPRIM) 300 MG tablet Unknown at unsure if taking Self No Yes   Sig: Take 1 tablet (300 mg) by mouth daily   finasteride (PROPECIA) 1 MG tablet 9/26/2022 at AM Self Yes Yes   Sig: Take 1 mg by mouth daily Keeps brand, for hair growth   ibuprofen (ADVIL/MOTRIN) 200 MG capsule Unknown at Unknown time Self Yes Yes   Sig: Take 800 mg by mouth every morning   ibuprofen (ADVIL/MOTRIN) 200 MG capsule Unknown at Unknown time Self Yes Yes   Sig: Take 600 mg by mouth every evening   multivitamin w/minerals (THERA-VIT-M) tablet More than a month at takes when remembers Self Yes Yes   Sig: Take 1 tablet by mouth daily   sildenafil (VIAGRA) 50 MG tablet Unknown at none last 48 hours Self No Yes   Sig: Take 1 tablet (50 mg) by mouth daily as needed (erectile dysfunction) 30-60 minutes before intercourse   topiramate (TOPAMAX) 25 MG tablet  at has home supply, has not taken yet Self No Yes   Sig: Take 1 tablet (25 mg) by mouth  2 times daily as needed (nerve pain)      Facility-Administered Medications: None     ALLERGIES  Allergies   Allergen Reactions     Gabapentin Rash     LABS:  ROUTINE ICU LABS (Last four results)  CMPRecent Labs   Lab 09/27/22 0044      POTASSIUM 3.9   CHLORIDE 103   CO2 29   ANIONGAP 7   *   BUN 11   CR 0.88   GFRESTIMATED >90   PRAVEEN 9.2     CBC  Recent Labs   Lab 09/27/22 0044   WBC 7.3   RBC 5.13   HGB 14.2   HCT 44.0   MCV 86   MCH 27.7   MCHC 32.3   RDW 12.9        INR  Recent Labs   Lab 09/27/22 0044   INR 0.95     Arterial Blood GasNo lab results found in last 7 days.    EXAM:  Temp:  [97.9  F (36.6  C)] 97.9  F (36.6  C)  Pulse:  [61-80] 77  Resp:  [11-16] 15  BP: (149-193)/() 167/101  SpO2:  [96 %-97 %] 96 %     General: Pleasant, well nourished, cooperative male in no acute distress.    Neuro:  A&O x 3. Moves all extremities equally.  Resp:  Lungs clear to auscultation bilaterally.  Cardio:  S1S2 and reg, without murmur, clicks or rubs  Abdomen:  Soft, non-distended, non-tender, positive bowel sounds.  Vascular: +2/4 bilateral dorsalis pedis pulses,    Right calf swollen, shiny, ankle mildly swollen. Right thigh does not appear swollen and is equal in size to the left. Left lower extremity appears WNL with no swelling.   Skin:  Without excoriations, ecchymosis, erythema, lesions or open sores.  MSK:  No gross motor weakness.  Sensation intact.  Proprioception intact.     ASSESSMENT/PLAN: Gualberto Smith is a 57 year old male who presented to the ED for evaluation of leg swelling possibly precipitated by decrease in activity and bedrest with a back injury in August and a prolonged car trip recently.     Reviewed imaging and chart notes, labs with IR Dr Luiza Boyce. She would not recommend thrombectomy as most of the clot is lower leg and does not extend into the iliac veins and is non occlusive in the right common femoral vein. No IVC filter as long as the patient is  tolerating blood thinners. If Gualberto's leg was appearing severely compromised or swollen IR thrombectomy could be considered.     Since patient's physical presentation and symptoms have improved greatly with IV Heparin, standard of care with blood thinners is recommended.     All of this was explained to Gualberto and his wife, also answered questions. They appeared satisfied with the discussion.     Please contact IR with any change in symptoms or any questions.      Thanks [facility] Interventional Radiology CNP ([phone]) (phone [phone])

## 2022-09-27 NOTE — PROGRESS NOTES
Brief, no charge note. Patient admitted by my colleague DR Rodrigues today.    Gualberto Smith is a 57 year old male with a PMH of anxiety and neuropathy who presented with 1 week of right leg swelling and noted with acute DVT.    -Patient seen and examined in ED, noted right LE swelling, no tenderness  -denies any chest pain or shortness of breath; respiratory status stable    US RLE noted with extensive DVT within the right lower extremity. Nonocclusive clot identified in the right common femoral vein which becomes occlusive in the mid superficial femoral vein extending into the posterior tibial vein, peroneal vein, and gastrocnemius veins. The external iliac vein appears to be patent. No superficial thrombophlebitis. No popliteal cyst.     Right lower extremity DVT  - US RLE as above with extensive DVT; Approximately 1 month ago the patient was immobilized for about a week due to back pain which is likely the cause of his DVT  - evaluated by interventional radiology and suggested no need for mechanical thrombectomy or IVC filter  - Continue with heparin drip; consulted pharmacy liaison for anticoagulation coverage, has coverage for Xarelto/Eliquis; will switch to Xarelto upon discharge   - will have outpatient follow-up with vascular medicine     Hypertension  - on admission noted BP in 180s---190s; BP still elevated in 150s--160s  - h/o borderline hypertension but not on meds PTA  - will start amlodipine 5 mg daily  - Hydralazine IV prn for SBP>180    Anxiety  Neuropathy  -will resume PTA Cymbalta, Topamax    Code status: Full Code  DVT ppx: Heparin drip    Dispo: likely 9/28 if clinically stable with anticoagulation

## 2022-09-27 NOTE — ED PROVIDER NOTES
"  History   Chief Complaint:  Leg Swelling     HPI   Gualberto Smith is a 57 year old male who presents for evaluation of leg swelling. Approximately six days ago the patient started to develop new swelling and pain to his right lower leg. On 9/22/22 he drove down to [place] and he returned from there yesterday, and while driving he would typically sit for 4.5 hours at a time. He did have this pain and swelling prior to going to [place] but since then it has been worsening. Due to his ongoing pain and swelling today he came into the ED for evaluation. He notes that recently he has some shortness of breath when going up multiple flights of stairs at work, which is new for him, although he attributes this to some decreased activity recently related to back pain. He denies any fever, cough, or chest pain. He notes that he had a cough about six weeks ago but this has since resolved. He has no history of blood clots.     Review of Systems   Constitutional: Negative for fever.   Respiratory: Positive for shortness of breath. Negative for cough.    Cardiovascular: Positive for leg swelling (right). Negative for chest pain.   All other systems reviewed and are negative.      Allergies:  Gabapentin       Medications:  Acyclovir  Allopurinol  Cymbalta  Viagra  Topiramate     Past Medical History:     ED   Recurrent cold sores       Family History:    Cancer - Mother and sister   CVA - Father  Hypertension - Father     Social History:  The patient presents to the ED alone.   Tobacco use: Negative      Physical Exam     Patient Vitals for the past 24 hrs:   BP Temp Temp src Pulse Resp SpO2 Height Weight   09/27/22 0101 (!) 186/109 -- -- 73 16 96 % -- --   09/27/22 0046 -- -- -- -- -- -- 1.93 m (6' 4\") 103 kg (227 lb)   09/26/22 1918 (!) 184/99 97.9  F (36.6  C) Temporal 71 16 -- 1.93 m (6' 4\") 102.1 kg (225 lb)       Physical Exam    Physical Exam   Constitutional:  Patient is oriented to person, place, and time. They appear " well-developed and well-nourished. Mild distress secondary to leg pain.    HENT:   Mouth/Throat:   Oropharynx is clear and moist.   Eyes:    Conjunctivae normal and EOM are normal. Pupils are equal, round, and reactive to light.   Neck:    Normal range of motion.   Cardiovascular: Normal rate, regular rhythm and normal heart sounds.  Exam reveals no gallop and no friction rub.  No murmur heard.  Pulmonary/Chest:  Effort normal and breath sounds normal. Patient has no wheezes. Patient has no rales. No pleuritic pain  Musculoskeletal:  Normal range of motion. Right lower extremity edema with mild discomfort. No erythema  Neurological:   Patient is alert and oriented to person, place, and time. Patient has normal strength. No cranial nerve deficit or sensory deficit. GCS 15  Skin:   Skin is warm and dry. No rash noted. No erythema.   Psychiatric:   Patient has a normal mood and affect. Patient's behavior is normal. Judgment and thought content normal.        Emergency Department Course     Imaging:  US Lower Extremity Venous Duplex Right   Final Result   IMPRESSION:   1.  Extensive DVT identified within the right lower extremity.      2.  These findings were discussed with Dr. Baird at the time of this dictation.      Report per radiology     Laboratory:  Labs Ordered and Resulted from Time of ED Arrival to Time of ED Departure   BASIC METABOLIC PANEL - Abnormal       Result Value    Sodium 139      Potassium 3.9      Chloride 103      Carbon Dioxide (CO2) 29      Anion Gap 7      Urea Nitrogen 11      Creatinine 0.88      Calcium 9.2      Glucose 115 (*)     GFR Estimate >90     PARTIAL THROMBOPLASTIN TIME - Normal    aPTT 27     INR - Normal    INR 0.95     COVID-19 VIRUS (CORONAVIRUS) BY PCR - Normal    SARS CoV2 PCR Negative     CBC WITH PLATELETS AND DIFFERENTIAL    WBC Count 7.3      RBC Count 5.13      Hemoglobin 14.2      Hematocrit 44.0      MCV 86      MCH 27.7      MCHC 32.3      RDW 12.9      Platelet  Count 195      % Neutrophils 58      % Lymphocytes 27      % Monocytes 9      % Eosinophils 4      % Basophils 1      % Immature Granulocytes 1      NRBCs per 100 WBC 0      Absolute Neutrophils 4.4      Absolute Lymphocytes 2.0      Absolute Monocytes 0.6      Absolute Eosinophils 0.3      Absolute Basophils 0.0      Absolute Immature Granulocytes 0.0      Absolute NRBCs 0.0           Emergency Department Course:     Reviewed:  I reviewed nursing notes, vitals and past medical history    Assessments:  0030:  I obtained history and examined the patient as noted above.      Consults:  0130: I spoke with Dr. Mina of the hospitalist service regarding patient's presentation, findings, and plan of care.     Interventions:  0055 Heparin 8,000 units IV   0055 Heparin 1,800 units/hr IV      Disposition:  The patient was admitted to the hospital under the care of Dr. Rodrigues.     Impression & Plan     CMS Diagnoses: None    Medical Decision Making:    Gualberto Hunter is a 57-year-old gentleman presenting to the emergency department with right lower leg swelling and discomfort he had no chest pain he was not dyspneic he has felt winded when he exerts himself lately but attributes this to being more sedentary over the past month.  He has no previous history of DVT or PE.  Ultrasound was performed from triage due to the significant wait times and excessive boarding in the emergency department.  The emergency department provider was contacted regarding the patient's extensive DVT.  He has no cyanosis or pain out of proportion.  Basic blood work was obtained.  I suspect part of the issue for this DVT is his prolonged sitting with the car ride but he states that it had started prior to him going on the road trip.  At this time I am recommending hospitalization anticoagulation due to the extensive nature but I do not see any emergent intervention that needs to be done.  Admit to hospitalist.     Diagnosis:    ICD-10-CM    1.  Acute deep vein thrombosis (DVT) of proximal vein of right lower extremity (H)  I82.4Y1           Scribe Disclosure:  I, Syed Nascimento, am serving as a scribe at 12:30 AM on 9/27/2022 to document services personally performed by Blaire Lafleur MD based on my observations and the provider's statements to me.           Blaire Lafleur MD  09/27/22 0254

## 2022-09-27 NOTE — ED TRIAGE NOTES
Swelling and tenderness to right LE for 5-6 days.     Triage Assessment     Row Name 09/26/22 1921       Triage Assessment (Adult)    Airway WDL WDL       Respiratory WDL    Respiratory WDL WDL       Skin Circulation/Temperature WDL    Skin Circulation/Temperature WDL WDL       Cardiac WDL    Cardiac WDL WDL       Peripheral/Neurovascular WDL    Peripheral Neurovascular WDL X  swelling and redness noted to right LE       Cognitive/Neuro/Behavioral WDL    Cognitive/Neuro/Behavioral WDL WDL

## 2022-09-27 NOTE — ED NOTES
[facility]  ED Nurse Handoff Report    ED Chief complaint: Leg Swelling      ED Diagnosis:   Final diagnoses:   Acute deep vein thrombosis (DVT) of proximal vein of right lower extremity (H)       Code Status: Full Code    Allergies:   Allergies   Allergen Reactions     Gabapentin Rash       Patient Story: Pt reports swelling and tenderness to RLE x6 days.   Focused Assessment:  Pt has swelling, redness, and tenderness to RLE.     US Lower Extremity Venous Duplex Right   Final Result   IMPRESSION:   1.  Extensive DVT identified within the right lower extremity.      2.  These findings were discussed with Dr. Baird at the time of this dictation.        Labs Ordered and Resulted from Time of ED Arrival to Time of ED Departure   BASIC METABOLIC PANEL - Abnormal       Result Value    Sodium 139      Potassium 3.9      Chloride 103      Carbon Dioxide (CO2) 29      Anion Gap 7      Urea Nitrogen 11      Creatinine 0.88      Calcium 9.2      Glucose 115 (*)     GFR Estimate >90     PARTIAL THROMBOPLASTIN TIME - Normal    aPTT 27     INR - Normal    INR 0.95     COVID-19 VIRUS (CORONAVIRUS) BY PCR - Normal    SARS CoV2 PCR Negative     CBC WITH PLATELETS AND DIFFERENTIAL    WBC Count 7.3      RBC Count 5.13      Hemoglobin 14.2      Hematocrit 44.0      MCV 86      MCH 27.7      MCHC 32.3      RDW 12.9      Platelet Count 195      % Neutrophils 58      % Lymphocytes 27      % Monocytes 9      % Eosinophils 4      % Basophils 1      % Immature Granulocytes 1      NRBCs per 100 WBC 0      Absolute Neutrophils 4.4      Absolute Lymphocytes 2.0      Absolute Monocytes 0.6      Absolute Eosinophils 0.3      Absolute Basophils 0.0      Absolute Immature Granulocytes 0.0      Absolute NRBCs 0.0           Treatments and/or interventions provided: Heparin 1,800 units/hr  Patient's response to treatments and/or interventions: Pt resting comfortably on cot    To be done/followed up on inpatient unit:  Per  "admitting    Does this patient have any cognitive concerns?: AxO x4    Activity level - Baseline/Home:  Independent  Activity Level - Current:   Independent    Patient's Preferred language: English   Needed?: No    Isolation: None  Infection: Not Applicable  Patient tested for COVID 19 prior to admission: YES  Bariatric?: No    Vital Signs:   Vitals:    09/26/22 1918 09/27/22 0046 09/27/22 0101   BP: (!) 184/99  (!) 186/109   Pulse: 71  73   Resp: 16  16   Temp: 97.9  F (36.6  C)     TempSrc: Temporal     SpO2:   96%   Weight: 102.1 kg (225 lb) 103 kg (227 lb)    Height: 1.93 m (6' 4\") 1.93 m (6' 4\")        Cardiac Rhythm:     Was the PSS-3 completed:   Yes  What interventions are required if any?               Family Comments: N/A  OBS brochure/video discussed/provided to patient/family: N/A              Name of person given brochure if not patient: n/a              Relationship to patient: n/a    For the majority of the shift this patient's behavior was Green.   Behavioral interventions performed were Care and rounding.    ED NURSE PHONE NUMBER: *[phone]         "

## 2022-09-28 ENCOUNTER — TELEPHONE (OUTPATIENT)
Dept: OTHER | Facility: CLINIC | Age: 57
End: 2022-09-28

## 2022-09-28 VITALS
SYSTOLIC BLOOD PRESSURE: 151 MMHG | HEART RATE: 76 BPM | HEIGHT: 76 IN | RESPIRATION RATE: 16 BRPM | BODY MASS INDEX: 27.64 KG/M2 | WEIGHT: 227 LBS | DIASTOLIC BLOOD PRESSURE: 98 MMHG | OXYGEN SATURATION: 98 % | TEMPERATURE: 98 F

## 2022-09-28 LAB — UFH PPP CHRO-ACNC: 0.61 IU/ML

## 2022-09-28 PROCEDURE — 96366 THER/PROPH/DIAG IV INF ADDON: CPT

## 2022-09-28 PROCEDURE — G0378 HOSPITAL OBSERVATION PER HR: HCPCS

## 2022-09-28 PROCEDURE — 36415 COLL VENOUS BLD VENIPUNCTURE: CPT | Performed by: HOSPITALIST

## 2022-09-28 PROCEDURE — 250N000013 HC RX MED GY IP 250 OP 250 PS 637: Performed by: HOSPITALIST

## 2022-09-28 PROCEDURE — 250N000011 HC RX IP 250 OP 636: Performed by: EMERGENCY MEDICINE

## 2022-09-28 PROCEDURE — 99217 PR OBSERVATION CARE DISCHARGE: CPT | Performed by: HOSPITALIST

## 2022-09-28 PROCEDURE — 85520 HEPARIN ASSAY: CPT | Performed by: HOSPITALIST

## 2022-09-28 RX ORDER — AMLODIPINE BESYLATE 10 MG/1
10 TABLET ORAL DAILY
Qty: 30 TABLET | Refills: 0 | Status: SHIPPED | OUTPATIENT
Start: 2022-09-29 | End: 2022-10-26

## 2022-09-28 RX ORDER — ACETAMINOPHEN 325 MG/1
650 TABLET ORAL EVERY 6 HOURS PRN
COMMUNITY
Start: 2022-09-28

## 2022-09-28 RX ORDER — AMLODIPINE BESYLATE 10 MG/1
10 TABLET ORAL DAILY
Status: DISCONTINUED | OUTPATIENT
Start: 2022-09-28 | End: 2022-09-28 | Stop reason: HOSPADM

## 2022-09-28 RX ADMIN — RIVAROXABAN 15 MG: 15 TABLET, FILM COATED ORAL at 08:45

## 2022-09-28 RX ADMIN — HEPARIN SODIUM 1600 UNITS/HR: 10000 INJECTION, SOLUTION INTRAVENOUS at 01:53

## 2022-09-28 RX ADMIN — AMLODIPINE BESYLATE 10 MG: 10 TABLET ORAL at 08:45

## 2022-09-28 ASSESSMENT — ACTIVITIES OF DAILY LIVING (ADL)
ADLS_ACUITY_SCORE: 20

## 2022-09-28 NOTE — PROVIDER NOTIFICATION
"MD Notification    Notified Person: MD    Notified Person Name: Dr. Estradadean    Notification Date/Time: 9/28/22 @ 1138    Notification Interaction: aris    Purpose of Notification: \"Saw discharge orders placed. Just want to confirm that patient is OK to drive self home? Car is currently here\"    Orders Received: OK for pt to drive home    Comments:    "

## 2022-09-28 NOTE — UTILIZATION REVIEW
"[facility]     Admission Status; Secondary Review Determination     Admission Date: 9/27/2022  1:42 AM      Under the authority of the Utilization Management Committee, the utilization review process indicated a secondary review on the above patient.  The review outcome is based on review of the medical records, discussions with staff, and applying clinical experience noted on the date of the review.          (x) Observation Status Appropriate - This patient does not meet hospital inpatient criteria and is placed in observation status. If this patient's primary payer is Medicare and was admitted as an inpatient, Condition Code 44 should be used and patient status changed to \"observation\".     RATIONALE FOR DETERMINATION   57-year-old male with a history of anxiety and neuropathy was admitted yesterday with an acute DVT.  No mechanical thrombectomy or IVC filter was placed.  He was started on a heparin drip.  He was transitioned to a DOAC today.  He is hemodynamically stable and discharging on hospital day 2.  At the time of this review there is no medical necessity for inpatient hospitalization and observation status is recommended.    The severity of illness, intensity of service provided, expected LOS and risk for adverse outcome make the care appropriate for further observation; however, doesn't meet criteria for hospital inpatient admission.  Dr Rolon notified of this determination.        The information on this document is developed by the utilization review team in order for the business office to ensure compliance.  This only denotes the appropriateness of proper admission status and does not reflect the quality of care rendered.         The definitions of Inpatient Status and Observation Status used in making the determination above are those provided in the CMS Coverage Manual, Chapter 1 and Chapter 6, section 70.4.      Sincerely,     Michael Love DO MPH   Physician Advisor  Utilization " Review  [facility]

## 2022-09-28 NOTE — PLAN OF CARE
Reason for Admission: R DVT  Cognitive/Mentation: A/Ox 4  Neuros/CMS: Stable, R leg swollen, red, and tender.   VS: VSS on RA   Tele: N/A  GI: BS audible, + flatus. Continent.  : Voiding adequately. Continent.  Pulmonary: LS clear  Pain: Denies, complains of stiffness.   Drains/Lines: PIV, infusing Heparin at 1600 unit(s)/hr.   Skin: Intact  Activity: Up independently in the room  Diet: Regular diet with thin liquids. Takes pills whole.   Therapies recs: Home  Discharge: Pending   Aggression Stoplight Tool: Green  End of shift summary: No changes this shift. Next Heparin Xa recheck was this morning- pending results.

## 2022-09-28 NOTE — DISCHARGE SUMMARY
[facility]  Discharge Summary        Gualberto Smith MRN# [id number]   YOB: 1965 Age: 57 year old     Date of Admission:  9/27/2022  Date of Discharge:  9/28/2022  Admitting Physician:  Fercho Oneal MD  Discharge Physician: Jakob Rolon MD  Discharging Service: Hospitalist     Primary Provider: Ede Joseph  Primary Care Physician Phone Number: [phone]         Discharge Diagnoses/Problem Oriented Hospital Course (Providers):    Gualberto Smith was admitted on 9/27/2022 by Fercho Oneal MD and I would refer you to their history and physical.  The following problems were addressed during his hospitalization:    Gualberto Smith is a 57 year old male with a PMH of anxiety and neuropathy who presented with 1 week of right leg swelling and noted with acute DVT.      US RLE noted with extensive DVT within the right lower extremity. Nonocclusive clot identified in the right common femoral vein which becomes occlusive in the mid superficial femoral vein extending into the posterior tibial vein, peroneal vein, and gastrocnemius veins. The external iliac vein appears to be patent. No superficial thrombophlebitis. No popliteal cyst.     Right lower extremity DVT  - US RLE as above with extensive DVT; Approximately 1 month ago the patient was immobilized for about a week due to back pain which is likely the cause of his DVT  - evaluated by interventional radiology and suggested no need for mechanical thrombectomy or IVC filter  - was started on heparin drip; consulted pharmacy liaison for anticoagulation coverage, has coverage for Xarelto/Eliquis; switched to Xarelto upon discharge   - referral made for outpatient follow-up with Vascular Medicine  - d/kary PTA Advil to minimize risk of bleeding with concurrent anticoagulation     Hypertension  - on admission noted BP in 180s---190s  - h/o borderline hypertension but not on meds PTA  - started on Amlodipine 10 mg  daily     Anxiety  Neuropathy  - continue PTA Cymbalta, Topamax     Code status: Full Code        Brief Hospital Stay Summary Sent Home With Patient in AVS:        Reason for your hospital stay      You were admitted with right leg DVT and have been started on a blood   thinner. Referral has been made to Vascular Medicine clinic.                 Pending Results:        Unresulted Labs Ordered in the Past 30 Days of this Admission     No orders found for last 31 day(s).            Discharge Instructions and Follow-Up:      Follow-up Appointments     Follow-up and recommended labs and tests       Follow up with primary care provider, Ede Joseph, within 7 days   for hospital follow- up.  The following labs/tests are recommended: repeat   CBC and BMP.    Referral made to Vascular Medicine clinic                 Discharge Disposition:      Discharged to home        Discharge Medications:        Current Discharge Medication List      START taking these medications    Details   acetaminophen (TYLENOL) 325 MG tablet Take 2 tablets (650 mg) by mouth every 6 hours as needed for mild pain or other (and adjunct with moderate or severe pain or per patient request)    Associated Diagnoses: Acute deep vein thrombosis (DVT) of proximal vein of right lower extremity (H)      amLODIPine (NORVASC) 10 MG tablet Take 1 tablet (10 mg) by mouth daily for 30 days  Qty: 30 tablet, Refills: 0    Comments: Future refills by PCP Dr. Ede Joseph with phone number [phone].  Associated Diagnoses: Benign essential hypertension      !! rivaroxaban ANTICOAGULANT (XARELTO) 15 MG TABS tablet Take 1 tablet (15 mg) by mouth 2 times daily (with meals) for 21 days  Qty: 41 tablet, Refills: 0    Associated Diagnoses: Acute deep vein thrombosis (DVT) of proximal vein of right lower extremity (H)      !! rivaroxaban ANTICOAGULANT (XARELTO) 20 MG TABS tablet Take 1 tablet (20 mg) by mouth daily (with dinner) (To start after completing  the 15 mg twice a day dosing)  Qty: 30 tablet, Refills: 0    Comments: Future refills by PCP Dr. Ede Joseph with phone number [phone].  Associated Diagnoses: Acute deep vein thrombosis (DVT) of proximal vein of right lower extremity (H)       !! - Potential duplicate medications found. Please discuss with provider.      CONTINUE these medications which have NOT CHANGED    Details   acyclovir (ZOVIRAX) 400 MG tablet TAKE 1 TABLET BY MOUTH THREE TIMES DAILY for 5 days as needed for cold sore.  Qty: 20 tablet, Refills: 1    Associated Diagnoses: Recurrent cold sores      allopurinol (ZYLOPRIM) 300 MG tablet Take 1 tablet (300 mg) by mouth daily  Qty: 30 tablet, Refills: 11    Associated Diagnoses: Proctalgia fugax      DULoxetine (CYMBALTA) 20 MG capsule Take 1 capsule (20 mg) by mouth daily  Qty: 90 capsule, Refills: 3    Associated Diagnoses: Neuropathy; Generalized anxiety disorder      finasteride (PROPECIA) 1 MG tablet Take 1 mg by mouth daily Keeps brand, for hair growth      multivitamin w/minerals (THERA-VIT-M) tablet Take 1 tablet by mouth daily  Qty: 100 tablet, Refills: 3      Omega-3 Fatty Acids (FISH OIL PO) Take 1 capsule by mouth daily Unspecified capsule strength      sildenafil (VIAGRA) 50 MG tablet Take 1 tablet (50 mg) by mouth daily as needed (erectile dysfunction) 30-60 minutes before intercourse  Qty: 30 tablet, Refills: 3    Associated Diagnoses: Erectile dysfunction, unspecified erectile dysfunction type      topiramate (TOPAMAX) 25 MG tablet Take 1 tablet (25 mg) by mouth 2 times daily as needed (nerve pain)  Qty: 60 tablet, Refills: 11    Associated Diagnoses: Neuropathy      VITAMIN E PO Take 1 capsule by mouth daily Unspecified capsule strength         STOP taking these medications       ibuprofen (ADVIL/MOTRIN) 200 MG capsule Comments:   Reason for Stopping:         ibuprofen (ADVIL/MOTRIN) 200 MG capsule Comments:   Reason for Stopping:                 Allergies:        "  Allergies   Allergen Reactions     Gabapentin Rash           Consultations This Hospital Stay:      Consultation during this admission received from interventional radiology        Condition and Physical on Discharge:      Discharge condition: Stable   Vitals: Blood pressure (!) 151/98, pulse 76, temperature 98  F (36.7  C), temperature source Oral, resp. rate 16, height 1.93 m (6' 4\"), weight 103 kg (227 lb), SpO2 98 %.     Constitutional: Alert, awake and orienetd X 3; lying comfortably in bed in no apparent distress   HEENT: Pupils equal and reactive to light and accomodation, EOMI intact; neck supple no raised JVD or rigidity    Oral cavity: Moist mucosa   Cardiovascular: Normal s1 s2, regular rate and rhythm, no murmur   Lungs: B/l clear to auscultation, no wheezes or crepitations   Abdomen: Soft, nt, nd, no guarding, rigidity or rebound; BS +   LE :  right lower extremity swelling  (improving ), no significant tenderness    Musculoskeletal: Power 5/5 in all extremities   Neuro: No focal neurological deficits noted, CN II to XII grossly intact   Psychiatry: normal mood and affect             Discharge Time:      Greater than 30 minutes.        Image Results From This Hospital Stay (For Non-EPIC Providers):        Results for orders placed or performed during the hospital encounter of 09/27/22   US Lower Extremity Venous Duplex Right    Narrative    EXAM: US LOWER EXTREMITY VENOUS DUPLEX RIGHT  LOCATION: [facility]  DATE/TIME: 9/26/2022 8:23 PM    INDICATION: leg swelling  COMPARISON: None.  TECHNIQUE: Venous Duplex ultrasound of the right lower extremity with and without compression, augmentation and duplex. Color flow and spectral Doppler with waveform analysis performed.    FINDINGS: Exam includes the common femoral, femoral, popliteal, and contralateral common femoral veins as well as segmentally visualized deep calf veins and greater saphenous vein.     RIGHT: Nonocclusive clot " identified in the right common femoral vein which becomes occlusive in the mid superficial femoral vein extending into the posterior tibial vein, peroneal vein, and gastrocnemius veins. The external iliac vein appears to be   patent. No superficial thrombophlebitis. No popliteal cyst.      Impression    IMPRESSION:  1.  Extensive DVT identified within the right lower extremity.    2.  These findings were discussed with Dr. Baird at the time of this dictation.           Most Recent Lab Results In EPIC (For Non-EPIC Providers):    Most Recent 3 CBC's:  Recent Labs   Lab Test 09/27/22  0044 08/25/22  1531 05/21/21  1554   WBC 7.3 9.0 6.3   HGB 14.2 16.2 14.4   MCV 86 86 83    180 167      Most Recent 3 BMP's:  Recent Labs   Lab Test 09/27/22  0044 08/25/22  1531 03/11/22  1241    135 139   POTASSIUM 3.9 3.9 4.8   CHLORIDE 103 101 101   CO2 29 29 26   BUN 11 19 14   CR 0.88 1.17 1.00   ANIONGAP 7 5 12   PRAVEEN 9.2 9.3 9.9   * 101* 93     Most Recent 3 Troponin's:  Recent Labs   Lab Test 07/23/15  0719 07/23/15  0204   TROPI <0.015  The 99th percentile for upper reference range is 0.045 ug/L.  Troponin values in   the range of 0.045 - 0.120 ug/L may be associated with risks of adverse   clinical events.    --    TROPONIN  --  0.01     Most Recent 3 INR's:  Recent Labs   Lab Test 09/27/22  0044   INR 0.95     Most Recent 2 LFT's:  Recent Labs   Lab Test 08/25/22  1531 05/21/21  1554   AST 15 24   ALT 39 52   ALKPHOS 66 63   BILITOTAL 0.5 0.4     Most Recent Cholesterol Panel:  Recent Labs   Lab Test 03/11/22  1241   CHOL 240*   *   HDL 44   TRIG 115     Most Recent 6 Bacteria Isolates From Any Culture (See EPIC Reports for Culture Details):  Recent Labs   Lab Test 07/23/15  0448   CULT No Beta Streptococcus isolated     Most Recent TSH, T4 and HgbA1c:   Recent Labs   Lab Test 09/09/22  1425 05/21/21  1554   TSH 2.89  --    A1C  --  5.6

## 2022-09-28 NOTE — TELEPHONE ENCOUNTER
Pt referred to [facility] by Jakob Rolon MD for right lower extremity DVT    Pt needs to be scheduled for consult with Valencia Amaro PA-C.  Will route to scheduling to coordinate an appointment at next available.    Appears pt is getting discharged today from hospital.    Appt note: Ref by Dr. Jakob Rolon for right lower extremity DVT; hospital follow up to 9/27/22 admission; pt on Xarelto.    MARKEL RossN, RN-BC  [facility] [place]

## 2022-09-28 NOTE — PROGRESS NOTES
Patient seen and examined    -Feels fine, no acute issues overnight; right leg swelling improving  -denies any chest pain or shortness of breath; respiratory status stable on room air  - BP still in 140s---150s; will increase amlodipine to 10 mg daily  - will switch heparin drip to PO Xarelto    Discharge home today    Please see discharge summary for details

## 2022-09-29 NOTE — TELEPHONE ENCOUNTER
Future Appointments   Date Time Provider Department Center   10/5/2022  1:20 PM Valencia Amaro PA-C [facility]

## 2022-10-05 ENCOUNTER — OFFICE VISIT (OUTPATIENT)
Dept: OTHER | Facility: CLINIC | Age: 57
End: 2022-10-05
Attending: HOSPITALIST
Payer: COMMERCIAL

## 2022-10-05 VITALS
DIASTOLIC BLOOD PRESSURE: 81 MMHG | HEIGHT: 76 IN | HEART RATE: 85 BPM | BODY MASS INDEX: 27.16 KG/M2 | WEIGHT: 223 LBS | SYSTOLIC BLOOD PRESSURE: 123 MMHG | OXYGEN SATURATION: 97 %

## 2022-10-05 DIAGNOSIS — I82.4Y1 ACUTE DEEP VEIN THROMBOSIS (DVT) OF PROXIMAL VEIN OF RIGHT LOWER EXTREMITY (H): ICD-10-CM

## 2022-10-05 PROCEDURE — 99205 OFFICE O/P NEW HI 60 MIN: CPT | Performed by: PHYSICIAN ASSISTANT

## 2022-10-05 PROCEDURE — G0463 HOSPITAL OUTPT CLINIC VISIT: HCPCS

## 2022-10-05 NOTE — PROGRESS NOTES
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 Venous duplex was notable for non-occlusive DVT in the right common femoral vein, which then becomes occlusive in the mid superficial femoral vein and extends through the calf veins. He was admitted and started on IV heparin. IR consulted on him and no thrombectomy was indicated. He was ultimately transitioned to Xarelto and discharged.     He is doing well, but continues to have right ankle and foot swelling. He is tolerating Xarelto. He did get a pair of knee high compression socks and started wearing those recently with some relief. They did start him on Amlodipine in the hospital for hypertension. Blood pressure is now good.     This was his first lifetime thromboembolic event. No family history of blood clots. He was told that his father had thick blood and he would get frequent lab draws. He was very sedentary for about one week in 8/2022 due to back pain. It was at this time that he actually thinks he had COVID for a second time as he and his wife were sick and he slept for 20 out of 24 hours a day. He also went on a long car ride in September to [place] and back, but his symptoms had started before his trip. PSA is normal. He is due for a colonoscopy due to prior polyps.        PAST MEDICAL HISTORY  Past Medical History:   Diagnosis Date     NO ACTIVE PROBLEMS        CURRENT MEDICATIONS  acetaminophen (TYLENOL) 325 MG tablet, Take 2 tablets (650 mg) by mouth every 6 hours as needed for mild pain or other (and adjunct with moderate or severe pain or per patient request)  acyclovir (ZOVIRAX) 400 MG tablet, TAKE 1  TABLET BY MOUTH THREE TIMES DAILY for 5 days as needed for cold sore.  allopurinol (ZYLOPRIM) 300 MG tablet, Take 1 tablet (300 mg) by mouth daily  amLODIPine (NORVASC) 10 MG tablet, Take 1 tablet (10 mg) by mouth daily for 30 days  DULoxetine (CYMBALTA) 20 MG capsule, Take 1 capsule (20 mg) by mouth daily  finasteride (PROPECIA) 1 MG tablet, Take 1 mg by mouth daily Keeps brand, for hair growth  multivitamin w/minerals (THERA-VIT-M) tablet, Take 1 tablet by mouth daily  Omega-3 Fatty Acids (FISH OIL PO), Take 1 capsule by mouth daily Unspecified capsule strength  rivaroxaban ANTICOAGULANT (XARELTO) 15 MG TABS tablet, Take 1 tablet (15 mg) by mouth 2 times daily (with meals) for 21 days  [START ON 10/19/2022] rivaroxaban ANTICOAGULANT (XARELTO) 20 MG TABS tablet, Take 1 tablet (20 mg) by mouth daily (with dinner) (To start after completing the 15 mg twice a day dosing)  sildenafil (VIAGRA) 50 MG tablet, Take 1 tablet (50 mg) by mouth daily as needed (erectile dysfunction) 30-60 minutes before intercourse  topiramate (TOPAMAX) 25 MG tablet, Take 1 tablet (25 mg) by mouth 2 times daily as needed (nerve pain)  VITAMIN E PO, Take 1 capsule by mouth daily Unspecified capsule strength    No current facility-administered medications on file prior to visit.      PAST SURGICAL HISTORY:  Past Surgical History:   Procedure Laterality Date     NO HISTORY OF SURGERY         ALLERGIES     Allergies   Allergen Reactions     Gabapentin Rash       FAMILY HISTORY  Family History   Problem Relation Age of Onset     Cancer Mother         lung,brain     Cerebrovascular Disease Father         70's     Hypertension Father      Respiratory Father      Heart Disease Maternal Grandfather      Cancer Sister         two of his sisters diagnosed with skin cancer-melanoma         SOCIAL HISTORY  Social History     Socioeconomic History     Marital status:      Spouse name: Not on file     Number of children: 2     Years of education: Not  "on file     Highest education level: Not on file   Occupational History     Occupation: Business     Employer: [employer]     Employer: [employer]   Tobacco Use     Smoking status: Never Smoker     Smokeless tobacco: Never Used   Substance and Sexual Activity     Alcohol use: Yes     Comment: 3 times per week     Drug use: No     Sexual activity: Yes     Partners: Female   Other Topics Concern     Parent/sibling w/ CABG, MI or angioplasty before 65F 55M? No   Social History Narrative    , 2 children                ROS:   General: No change in weight, sleep or appetite.  Normal energy.  No fever or chills  Eyes: Negative for vision changes or eye problems  ENT: No problems with ears, nose or throat.  No difficulty swallowing.  Resp: No coughing, wheezing or shortness of breath  CV: No chest pains or palpitations  GI: No nausea, vomiting,  heartburn, abdominal pain, diarrhea, constipation or change in bowel habits  : No urinary frequency or dysuria, bladder or kidney problems  Musculoskeletal: No significant muscle or joint pains  Neurologic: No headaches, numbness, tingling, weakness, problems with balance or coordination  Psychiatric: No problems with anxiety, depression or mental health  Heme/immune/allergy: No history of bleeding or clotting problems or anemia.  No allergies or immune system problems  Endocrine: No history of thyroid disease, diabetes or other endocrine disorders  Skin: No rashes,worrisome lesions or skin problems  Vascular:  No claudication, lifestyle limiting or otherwise; no ischemic rest pain; no non-healing ulcers. No weakness, No loss of sensation      EXAM:  /81 (BP Location: Left arm, Patient Position: Chair, Cuff Size: Adult Large)   Pulse 85   Ht 1.93 m (6' 4\")   Wt 101.2 kg (223 lb)   SpO2 97%   BMI 27.14 kg/m    In general, the patient is a pleasant male in no apparent distress.    HEENT: NC/AT.  PERRLA.  EOMI.  Sclerae white, not injected.  " Nares clear.  Pharynx without erythema or exudate.  Dentition intact.    Heart: RRR. Normal S1, S2 splits physiologically. No murmur, rub, click, or gallop.   Lungs: CTA.  No ronchi, wheezes, rales.   Abdomen: Soft, nontender, nondistended.  Extremities: No clubbing, cyanosis. Right ankle and foot with evidence of venous congestion with more prominent superficial veins. Palpable pedal pulses. No wounds.       Labs:  LIPID RESULTS:  Lab Results   Component Value Date    CHOL 240 (H) 03/11/2022    CHOL 217 (H) 05/21/2021    HDL 44 03/11/2022    HDL 44 05/21/2021     (H) 03/11/2022     (H) 05/21/2021    TRIG 115 03/11/2022    TRIG 144 05/21/2021    CHOLHDLRATIO 4.6 10/27/2014       LIVER ENZYME RESULTS:  Lab Results   Component Value Date    AST 15 08/25/2022    AST 24 05/21/2021    ALT 39 08/25/2022    ALT 52 05/21/2021       CBC RESULTS:  Lab Results   Component Value Date    WBC 7.3 09/27/2022    WBC 6.3 05/21/2021    RBC 5.13 09/27/2022    RBC 5.32 05/21/2021    HGB 14.2 09/27/2022    HGB 14.4 05/21/2021    HCT 44.0 09/27/2022    HCT 44.0 05/21/2021    MCV 86 09/27/2022    MCV 83 05/21/2021    MCH 27.7 09/27/2022    MCH 27.1 05/21/2021    MCHC 32.3 09/27/2022    MCHC 32.7 05/21/2021    RDW 12.9 09/27/2022    RDW 12.8 05/21/2021     09/27/2022     05/21/2021       BMP RESULTS:  Lab Results   Component Value Date     09/27/2022     05/21/2021    POTASSIUM 3.9 09/27/2022    POTASSIUM 4.2 05/21/2021    CHLORIDE 103 09/27/2022    CHLORIDE 107 05/21/2021    CO2 29 09/27/2022    CO2 28 05/21/2021    ANIONGAP 7 09/27/2022    ANIONGAP 5 05/21/2021     (H) 09/27/2022    GLC 97 05/21/2021    BUN 11 09/27/2022    BUN 17 05/21/2021    CR 0.88 09/27/2022    CR 1.09 05/21/2021    GFRESTIMATED >90 09/27/2022    GFRESTIMATED 75 05/21/2021    GFRESTBLACK 87 05/21/2021    PRAVEEN 9.2 09/27/2022    PRAVEEN 9.4 05/21/2021        A1C RESULTS:  Lab Results   Component Value Date    A1C 5.6  05/21/2021       THYROID RESULTS:  Lab Results   Component Value Date    TSH 2.89 09/09/2022       Procedures:   EXAM: US LOWER EXTREMITY VENOUS DUPLEX RIGHT  LOCATION[facility]  DATE/TIME: 9/26/2022 8:23 PM     INDICATION: leg swelling  COMPARISON: None.  TECHNIQUE: Venous Duplex ultrasound of the right lower extremity with and without compression, augmentation and duplex. Color flow and spectral Doppler with waveform analysis performed.     FINDINGS: Exam includes the common femoral, femoral, popliteal, and contralateral common femoral veins as well as segmentally visualized deep calf veins and greater saphenous vein.      RIGHT: Nonocclusive clot identified in the right common femoral vein which becomes occlusive in the mid superficial femoral vein extending into the posterior tibial vein, peroneal vein, and gastrocnemius veins. The external iliac vein appears to be   patent. No superficial thrombophlebitis. No popliteal cyst.                                                                      IMPRESSION:  1.  Extensive DVT identified within the right lower extremity.      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   -Symptoms are improving on Xarelto but continues to have some right ankle and foot edema.     Recommendations:   -Continue Xarelto for 6 months minimum given extent of thrombus.   -Get and wear compression socks (20-30 mmHg thigh high) during the day. He should wear these for the next several years to prevent post-thrombotic syndrome in the future.   -In 6 months, get a d.dimer lab draw and a right lower extremity venous duplex. Then be seen in our vascular clinic to determine if Xarelto can be discontinued.   -No hypercoagulable work-up is needed  at this time as this is his first lifetime VTE event, he has no family history of clotting, and this appears to be provoked.   -Activity as tolerated.   -Hold off on colonoscopy for the next 6 months if possible as we would not want him to stop his anticoagulation. However, if he should really need one, he was advised to call us.   -He should get a heart monitor as ordered by his PCP. He had syncope/near syncope twice in the past year, last being in May 2022.   -If he continues to have ongoing edema in his right leg and develops some in his left ankle, consider switching amlodipine to another anti-hypertensive agent.       Valencia Amaro PA-C      60 minutes spent on the date of the encounter doing chart review, history and exam, documentation, and further activities as noted above.

## 2022-10-05 NOTE — PROGRESS NOTES
"Patient is here to discuss consult.    /81 (BP Location: Left arm, Patient Position: Chair, Cuff Size: Adult Large)   Pulse 85   Ht 6' 4\" (1.93 m)   Wt 223 lb (101.2 kg)   SpO2 97%   BMI 27.14 kg/m      Questions patient would like addressed today are: N/A.    Refills are needed: No    Has homecare services and agency name:  [facility] Eastman  "

## 2022-10-05 NOTE — PATIENT INSTRUCTIONS
Continue Xarelto.     2.  Get and wear compression socks (20-30 mmHg thigh high). Wear these during the day and remove them at night.     3.  In 6 months, get a d.dimer lab draw and an ultrasound of your leg. Then be seen by us to determine if your Xarelto can be discontinued. We will be sending you a letter closer to this time to schedule all of this.     4.  Elevate your right leg when sitting or lying to help with swelling.     5.  Get your heart monitor.     6.  Hold off on colonoscopy for the next 6 months. However, if there is concern and you need it done sooner, please call us.     7. Call us with any questions or concerns ([phone]).

## 2022-10-14 ENCOUNTER — HOSPITAL ENCOUNTER (OUTPATIENT)
Dept: CARDIOLOGY | Facility: CLINIC | Age: 57
Discharge: HOME | End: 2022-10-14
Attending: INTERNAL MEDICINE | Admitting: INTERNAL MEDICINE
Payer: COMMERCIAL

## 2022-10-14 DIAGNOSIS — Z87.898 HISTORY OF SYNCOPE: ICD-10-CM

## 2022-10-14 DIAGNOSIS — R42 VERTIGO: ICD-10-CM

## 2022-10-14 PROCEDURE — 93270 REMOTE 30 DAY ECG REV/REPORT: CPT

## 2022-10-24 PROCEDURE — 93272 ECG/REVIEW INTERPRET ONLY: CPT | Performed by: INTERNAL MEDICINE

## 2022-10-26 ENCOUNTER — E-VISIT (OUTPATIENT)
Dept: INTERNAL MEDICINE | Facility: CLINIC | Age: 57
End: 2022-10-26
Payer: COMMERCIAL

## 2022-10-26 ENCOUNTER — MYC MEDICAL ADVICE (OUTPATIENT)
Dept: INTERNAL MEDICINE | Facility: CLINIC | Age: 57
End: 2022-10-26

## 2022-10-26 DIAGNOSIS — I82.90 BLOOD CLOT IN VEIN: Primary | ICD-10-CM

## 2022-10-26 DIAGNOSIS — I10 BENIGN ESSENTIAL HYPERTENSION: ICD-10-CM

## 2022-10-26 PROCEDURE — 99207 PR NON-BILLABLE SERV PER CHARTING: CPT | Performed by: INTERNAL MEDICINE

## 2022-10-27 RX ORDER — AMLODIPINE BESYLATE 10 MG/1
10 TABLET ORAL DAILY
Qty: 30 TABLET | Refills: 3 | Status: SHIPPED | OUTPATIENT
Start: 2022-10-27 | End: 2022-11-23

## 2022-11-23 ENCOUNTER — VIRTUAL VISIT (OUTPATIENT)
Dept: INTERNAL MEDICINE | Facility: CLINIC | Age: 57
End: 2022-11-23
Payer: COMMERCIAL

## 2022-11-23 DIAGNOSIS — K59.4 PROCTALGIA FUGAX: ICD-10-CM

## 2022-11-23 DIAGNOSIS — Z86.16 HISTORY OF COVID-19: ICD-10-CM

## 2022-11-23 DIAGNOSIS — Z12.11 SCREEN FOR COLON CANCER: ICD-10-CM

## 2022-11-23 DIAGNOSIS — I82.4Y1 ACUTE DEEP VEIN THROMBOSIS (DVT) OF PROXIMAL VEIN OF RIGHT LOWER EXTREMITY (H): Primary | ICD-10-CM

## 2022-11-23 DIAGNOSIS — I10 ESSENTIAL HYPERTENSION: ICD-10-CM

## 2022-11-23 DIAGNOSIS — Z00.00 PREVENTATIVE HEALTH CARE: ICD-10-CM

## 2022-11-23 DIAGNOSIS — M10.9 URIC ACID ARTHROPATHY: ICD-10-CM

## 2022-11-23 PROBLEM — K57.30 DIVERTICULAR DISEASE OF LARGE INTESTINE: Status: ACTIVE | Noted: 2017-06-05

## 2022-11-23 PROCEDURE — 99214 OFFICE O/P EST MOD 30 MIN: CPT | Mod: GT | Performed by: INTERNAL MEDICINE

## 2022-11-23 RX ORDER — AMLODIPINE BESYLATE 10 MG/1
10 TABLET ORAL DAILY
Qty: 90 TABLET | Refills: 3 | Status: SHIPPED | OUTPATIENT
Start: 2022-11-23 | End: 2023-11-21

## 2022-11-23 NOTE — PATIENT INSTRUCTIONS
Continue Xarelto minimum 6 months    Consider COVID induces provoked clot    Continue amlodipine 10 mg daily    Encouraged trial of allopurinol    COVID by [employer]    Obtain colonoscopy record

## 2022-11-23 NOTE — PROGRESS NOTES
Gualberto is a 57 year old male contacting the clinic today via video, who will use the platform: Glass[employer] for the visit.  Phone # for [employer], or if Amwell drops:   Telephone Information:   Mobile [phone]          ASSESSMENT and PLAN:  1. Acute deep vein thrombosis (DVT) of proximal vein of right lower extremity (H)  Unprovoked but possible COVID related.  Need to research duration.  Question whether relation to proctalgia    2. Proctalgia fugax  Question relation to blood clot    3. Essential hypertension  Agree with addition of amlodipine.  No side effects  - amLODIPine (NORVASC) 10 MG tablet; Take 1 tablet (10 mg) by mouth daily  Dispense: 90 tablet; Refill: 3    4. Screen for colon cancer  Up-to-date.  Await records to prevent    5. History of COVID-19  Noted in May and August may have contributed    6. Preventative health care  - ZOSTER VACCINE RECOMBINANT ADJUVANTED (SHINGRIX); Future  - COVID-19 VACCINE BIVALENT BOOSTER 12+ (PFIZER); Future    7. Uric acid arthropathy  Encouraged trial of allopurinol       Patient Instructions   Continue Xarelto minimum 6 months    Consider COVID induces provoked clot    Continue amlodipine 10 mg daily    Encouraged trial of allopurinol    COVID by [employer]    Obtain colonoscopy record            Return in about 4 months (around 3/23/2023) for using a video visit.       CHIEF COMPLAINT:  Chief Complaint   Patient presents with     [facility] F/U     9/27-28 [facility]-Blood clots       HPI    HISTORY OF PRESENT ILLNESS:  Gualberto is a 57 year old male contacting the clinic today via video for follow-up of blood clot.  He was hospitalized from September 27 overnight September 28 for leg pain and found to have acute proximal DVT.  He was placed on blood thinners discharged home.  He has never had a blood clot.  Clot is unprovoked except that he had COVID in May and August.  No difficulties with blood thinner and the bleeding.  Discussed duration of treatment    Amlodipine added  "in hospital.  Blood pressure now controlled without edema    REVIEW OF SYSTEMS:   No bleeding bruising    PFSH:  Social History     Social History Narrative    , 2 children                TOBACCO USE:  History   Smoking Status     Never   Smokeless Tobacco     Never       VITALS:  There were no vitals filed for this visit.  There were no vitals taken for this visit. Estimated body mass index is 27.14 kg/m  as calculated from the following:    Height as of 10/5/22: 1.93 m (6' 4\").    Weight as of 10/5/22: 101.2 kg (223 lb).    PHYSICAL EXAM:  (observations via Video)  Alert and oriented sitting in his living room    MEDICATIONS:   Current Outpatient Medications   Medication Sig Dispense Refill     acetaminophen (TYLENOL) 325 MG tablet Take 2 tablets (650 mg) by mouth every 6 hours as needed for mild pain or other (and adjunct with moderate or severe pain or per patient request)       acyclovir (ZOVIRAX) 400 MG tablet TAKE 1 TABLET BY MOUTH THREE TIMES DAILY for 5 days as needed for cold sore. 20 tablet 1     allopurinol (ZYLOPRIM) 300 MG tablet Take 1 tablet (300 mg) by mouth daily 30 tablet 11     amLODIPine (NORVASC) 10 MG tablet Take 1 tablet (10 mg) by mouth daily 90 tablet 3     DULoxetine (CYMBALTA) 20 MG capsule Take 1 capsule (20 mg) by mouth daily 90 capsule 3     finasteride (PROPECIA) 1 MG tablet Take 1 mg by mouth daily Keeps brand, for hair growth       multivitamin w/minerals (THERA-VIT-M) tablet Take 1 tablet by mouth daily 100 tablet 3     Omega-3 Fatty Acids (FISH OIL PO) Take 1 capsule by mouth daily Unspecified capsule strength       rivaroxaban ANTICOAGULANT (XARELTO) 20 MG TABS tablet Take 1 tablet (20 mg) by mouth daily (with dinner) 90 tablet 3     sildenafil (VIAGRA) 50 MG tablet Take 1 tablet (50 mg) by mouth daily as needed (erectile dysfunction) 30-60 minutes before intercourse 30 tablet 3     topiramate (TOPAMAX) 25 MG tablet Take 1 tablet (25 mg) by mouth 2 times " daily as needed (nerve pain) 60 tablet 11     VITAMIN E PO Take 1 capsule by mouth daily Unspecified capsule strength         Outside Notes summarized: Hospital discharge summary  Labs, x-rays, cardiology, GI tests reviewed: Leg ultrasound  Recent Labs   Lab Test 09/27/22  0044 09/09/22  1425 08/25/22  1531 03/11/22  1241 03/11/22  1241 05/21/21  1554   HGB 14.2  --  16.2  --   --  14.4   WBC 7.3  --  9.0  --   --  6.3     --  135   < > 139 140   POTASSIUM 3.9  --  3.9   < > 4.8 4.2   CR 0.88  --  1.17   < > 1.00 1.09   A1C  --   --   --   --   --  5.6   PSA  --   --   --   --  0.61 0.42   URIC  --  6.7  --   --   --   --    B12  --  357  --   --   --   --    TSH  --  2.89  --   --   --   --    VITDT  --   --   --   --  11*  --    SED  --  7  --   --   --   --    CRP  --   --  <2.9  --   --   --     < > = values in this interval not displayed.     Lab Results   Component Value Date    [id number] Negative 09/27/2022     Lab Results   Component Value Date    CHOL 240 03/11/2022    CHOL 217 05/21/2021     New orders:   Orders Placed This Encounter   Procedures     ZOSTER VACCINE RECOMBINANT ADJUVANTED (SHINGRIX)     COVID-19 VACCINE BIVALENT BOOSTER 12+ (PFIZER)       Independent review of:    Supplemental history by:        Ede Joseph MD  [facility]    Video-Visit Details  Type of service:  Video Visit  Patient has given verbal consent to a Video visit?  Yes  How would you like to obtain your AVS?  MyChart  Will anyone else be joining your video visit? No    Originating Location (pt. Location): Home    Distant Location (provider location):  Off-site    Video Start Time: 9:42 AM  Video End time:  10:17 AM  Face to face plus orders: 35 minutes  Documentation time:  3 minutes     The visit lasted a total of 38 minutes

## 2022-11-23 NOTE — PROGRESS NOTES
"Gualberto is a 57 year old who is being evaluated via a billable video visit.      How would you like to obtain your AVS? MyChart  If the video visit is dropped, the invitation should be resent by: Text to cell phone: [phone]  Will anyone else be joining your video visit? No  {If patient encounters technical issues they should call [phone] :096972}        {PROVIDER CHARTING PREFERENCE:666840}    Subjective   Gualberto is a 57 year old presenting for the following health issues:    Hospital F/U (9/27-28 [facility]-Blood clots)      HPI     {SUPERLIST (Optional):839032}  {additonal problems for provider to add (Optional):815196}    Review of Systems   {ROS COMP (Optional):414032}      Objective           Vitals:  No vitals were obtained today due to virtual visit.    Physical Exam   {video visit exam brief selected:287982::\"GENERAL: Healthy, alert and no distress\",\"EYES: Eyes grossly normal to inspection.  No discharge or erythema, or obvious scleral/conjunctival abnormalities.\",\"RESP: No audible wheeze, cough, or visible cyanosis.  No visible retractions or increased work of breathing.  \",\"SKIN: Visible skin clear. No significant rash, abnormal pigmentation or lesions.\",\"NEURO: Cranial nerves grossly intact.  Mentation and speech appropriate for age.\",\"PSYCH: Mentation appears normal, affect normal/bright, judgement and insight intact, normal speech and appearance well-groomed.\"}    {Diagnostic Test Results (Optional):959714}    {AMBULATORY ATTESTATION (Optional):147308}        Video-Visit Details    Video Start Time: {video visit start/end time for provider to select:296934}    Type of service:  Video Visit    Video End Time:{video visit start/end time for provider to select:991487}    Originating Location (pt. Location): {video visit patient location:695749::\"Home\"}    {PROVIDER LOCATION On-site should be selected for visits conducted from your clinic location or adjoining [facility] hospital, academic office, or other " "nearby [facility] building. Off-site should be selected for all other provider locations, including home:492923}    Distant Location (provider location):  {virtual location provider:718738}    Platform used for Video Visit: {Virtual Visit Platforms:861914::\"[employer]\"}    "

## 2023-01-04 ENCOUNTER — IMMUNIZATION (OUTPATIENT)
Dept: NURSING | Facility: CLINIC | Age: 58
End: 2023-01-04
Payer: COMMERCIAL

## 2023-01-04 PROCEDURE — 91312 COVID-19 VACCINE BIVALENT BOOSTER 12+ (PFIZER): CPT

## 2023-01-04 PROCEDURE — 0124A COVID-19 VACCINE BIVALENT BOOSTER 12+ (PFIZER): CPT

## 2023-04-28 ENCOUNTER — LAB (OUTPATIENT)
Dept: LAB | Facility: CLINIC | Age: 58
End: 2023-04-28
Payer: COMMERCIAL

## 2023-04-28 DIAGNOSIS — I82.4Y1 ACUTE DEEP VEIN THROMBOSIS (DVT) OF PROXIMAL VEIN OF RIGHT LOWER EXTREMITY (H): ICD-10-CM

## 2023-04-28 PROCEDURE — 36415 COLL VENOUS BLD VENIPUNCTURE: CPT

## 2023-04-28 PROCEDURE — 85379 FIBRIN DEGRADATION QUANT: CPT

## 2023-04-29 LAB — D DIMER PPP FEU-MCNC: 0.29 UG/ML FEU (ref 0–0.5)

## 2023-05-02 ENCOUNTER — OFFICE VISIT (OUTPATIENT)
Dept: OTHER | Facility: CLINIC | Age: 58
End: 2023-05-02
Attending: PHYSICIAN ASSISTANT
Payer: COMMERCIAL

## 2023-05-02 VITALS
DIASTOLIC BLOOD PRESSURE: 74 MMHG | OXYGEN SATURATION: 97 % | SYSTOLIC BLOOD PRESSURE: 116 MMHG | HEART RATE: 73 BPM | BODY MASS INDEX: 27.79 KG/M2 | WEIGHT: 228.2 LBS | HEIGHT: 76 IN

## 2023-05-02 DIAGNOSIS — I82.4Y1 ACUTE DEEP VEIN THROMBOSIS (DVT) OF PROXIMAL VEIN OF RIGHT LOWER EXTREMITY (H): Primary | ICD-10-CM

## 2023-05-02 PROCEDURE — 99213 OFFICE O/P EST LOW 20 MIN: CPT | Performed by: PHYSICIAN ASSISTANT

## 2023-05-02 PROCEDURE — G0463 HOSPITAL OUTPT CLINIC VISIT: HCPCS

## 2023-05-02 NOTE — PROGRESS NOTES
"Patient is here to discuss follow up    /74 (BP Location: Right arm, Patient Position: Chair, Cuff Size: Adult Large)   Pulse 73   Ht 6' 4\" (1.93 m)   Wt 228 lb 3.2 oz (103.5 kg)   SpO2 97%   BMI 27.78 kg/m      Questions patient would like addressed today are: travel restrictions. continue compression socks?    Refills are needed: No    Has homecare services and agency name:  [facility] DELACRUZ    "

## 2023-05-02 NOTE — PATIENT INSTRUCTIONS
Continue Xarelto for now.     2.  Get your ultrasound. If it shows clot is gone, we will stop your Xarelto and start aspirin 81 mg daily. If it shows you still have a fair amount of clot, we may need to continue your Xarelto a few more months.     3. Continue to wear compression socks.     4. Call us with any questions or concerns ([phone]).

## 2023-05-09 ENCOUNTER — OFFICE VISIT (OUTPATIENT)
Dept: OTHER | Facility: CLINIC | Age: 58
End: 2023-05-09
Attending: PHYSICIAN ASSISTANT
Payer: COMMERCIAL

## 2023-05-09 ENCOUNTER — HOSPITAL ENCOUNTER (OUTPATIENT)
Dept: ULTRASOUND IMAGING | Facility: CLINIC | Age: 58
Discharge: HOME | End: 2023-05-09
Attending: PHYSICIAN ASSISTANT
Payer: COMMERCIAL

## 2023-05-09 VITALS
OXYGEN SATURATION: 94 % | HEIGHT: 76 IN | BODY MASS INDEX: 27.91 KG/M2 | DIASTOLIC BLOOD PRESSURE: 69 MMHG | SYSTOLIC BLOOD PRESSURE: 106 MMHG | HEART RATE: 74 BPM | WEIGHT: 229.2 LBS

## 2023-05-09 DIAGNOSIS — I82.4Y1 ACUTE DEEP VEIN THROMBOSIS (DVT) OF PROXIMAL VEIN OF RIGHT LOWER EXTREMITY (H): Primary | ICD-10-CM

## 2023-05-09 DIAGNOSIS — I82.4Y1 ACUTE DEEP VEIN THROMBOSIS (DVT) OF PROXIMAL VEIN OF RIGHT LOWER EXTREMITY (H): ICD-10-CM

## 2023-05-09 PROCEDURE — 99213 OFFICE O/P EST LOW 20 MIN: CPT | Performed by: PHYSICIAN ASSISTANT

## 2023-05-09 PROCEDURE — G0463 HOSPITAL OUTPT CLINIC VISIT: HCPCS | Mod: 25

## 2023-05-09 PROCEDURE — 93971 EXTREMITY STUDY: CPT | Mod: RT

## 2023-05-09 NOTE — PROGRESS NOTES
.  [facility]ENTER  VASCULAR MEDICINE FOLLOW-UP VISIT      PRIMARY HEALTH CARE PROVIDER:  Ede Joseph    REASON FOR VISIT:  6 month follow-up RLE DVT.       HPI: Gualberto Smith is a very pleasant 58 year old male who presented to the ER on 9/26/22 with right lower extremity swelling and pain. Venous duplex was notable for non-occlusive DVT in the right common femoral vein, which then becomes occlusive in the mid superficial femoral vein and extends through the calf veins. He was admitted and started on IV heparin. IR consulted on him and no thrombectomy was indicated. He was ultimately transitioned to Xarelto and discharged.      He is doing well. He is tolerating Xarelto. He has been wearing compression socks regularly. They did start him on Amlodipine in the hospital for hypertension. Blood pressure is now good. He has completed 6 months of anticoagulation. D.dimer has normalized. Unfortunately, he got confused about his ultrasound appointment time and missed it. He now had his ultrasound completed and presents to discuss results.      This was his first lifetime thromboembolic event. No family history of blood clots. He was told that his father had thick blood and he would get frequent lab draws. He was very sedentary for about one week in 8/2022 due to back pain. It was at this time that he actually thinks he had COVID for a second time as he and his wife were sick and he slept for 20 out of 24 hours a day. He also went on a long car ride in September to [place] and [facility], but his symptoms had started before his trip. PSA is normal. He is due for a colonoscopy due to prior polyps.     He is going in July to [place] with his daughter.       PAST MEDICAL HISTORY  Past Medical History:   Diagnosis Date     NO ACTIVE PROBLEMS        PAST SURGICAL HISTORY:  Past Surgical History:   Procedure Laterality Date     NO HISTORY OF SURGERY           CURRENT MEDICATIONS  acetaminophen (TYLENOL) 325 MG  tablet, Take 2 tablets (650 mg) by mouth every 6 hours as needed for mild pain or other (and adjunct with moderate or severe pain or per patient request)  acyclovir (ZOVIRAX) 400 MG tablet, TAKE 1 TABLET BY MOUTH THREE TIMES DAILY for 5 days as needed for cold sore.  allopurinol (ZYLOPRIM) 300 MG tablet, Take 1 tablet (300 mg) by mouth daily  amLODIPine (NORVASC) 10 MG tablet, Take 1 tablet (10 mg) by mouth daily  DULoxetine (CYMBALTA) 20 MG capsule, Take 1 capsule (20 mg) by mouth daily  finasteride (PROPECIA) 1 MG tablet, Take 1 mg by mouth daily Keeps brand, for hair growth  multivitamin w/minerals (THERA-VIT-M) tablet, Take 1 tablet by mouth daily  Omega-3 Fatty Acids (FISH OIL PO), Take 1 capsule by mouth daily Unspecified capsule strength  rivaroxaban ANTICOAGULANT (XARELTO) 20 MG TABS tablet, Take 1 tablet (20 mg) by mouth daily (with dinner)  sildenafil (VIAGRA) 50 MG tablet, Take 1 tablet (50 mg) by mouth daily as needed (erectile dysfunction) 30-60 minutes before intercourse  topiramate (TOPAMAX) 25 MG tablet, Take 1 tablet (25 mg) by mouth 2 times daily as needed (nerve pain)  VITAMIN E PO, Take 1 capsule by mouth daily Unspecified capsule strength    No current facility-administered medications on file prior to visit.      ALLERGIES     Allergies   Allergen Reactions     Gabapentin Rash       FAMILY HISTORY  Family History   Problem Relation Age of Onset     Cancer Mother         lung,brain     Cerebrovascular Disease Father         70's     Hypertension Father      Respiratory Father      Heart Disease Maternal Grandfather      Cancer Sister         two of his sisters diagnosed with skin cancer-melanoma       SOCIAL HISTORY  Social History     Socioeconomic History     Marital status:      Spouse name: Not on file     Number of children: 2     Years of education: Not on file     Highest education level: Not on file   Occupational History     Occupation: Business     Employer: [employer]      "Employer: [employer]   Tobacco Use     Smoking status: Never     Smokeless tobacco: Never   Vaping Use     Vaping status: Not on file   Substance and Sexual Activity     Alcohol use: Yes     Comment: weekends     Drug use: No     Sexual activity: Yes     Partners: Female   Other Topics Concern     Parent/sibling w/ CABG, MI or angioplasty before 65F 55M? No   Social History Narrative    , 2 children                  ROS:   Complete ROS negative other than what is stated in HPI.     EXAM:  /69 (BP Location: Right arm, Patient Position: Chair, Cuff Size: Adult Large)   Pulse 74   Ht 6' 4\" (1.93 m)   Wt 229 lb 3.2 oz (104 kg)   SpO2 94%   BMI 27.90 kg/m    In general, the patient is a pleasant male in no apparent distress.    HEENT: NC/AT.  PERRLA.  EOMI.  Sclerae white, not injected.    Heart: RRR. Normal S1, S2 splits physiologically. No murmur, rub, click, or gallop.   Lungs: CTA.  No ronchi, wheezes, rales.    Abdomen: Soft, nontender, nondistended.   Extremities: No clubbing, cyanosis, or edema. Compression socks are on.  No wounds.     Labs:  LIPID RESULTS:  Lab Results   Component Value Date    CHOL 240 (H) 03/11/2022    CHOL 217 (H) 05/21/2021    HDL 44 03/11/2022    HDL 44 05/21/2021     (H) 03/11/2022     (H) 05/21/2021    TRIG 115 03/11/2022    TRIG 144 05/21/2021    CHOLHDLRATIO 4.6 10/27/2014       LIVER ENZYME RESULTS:  Lab Results   Component Value Date    AST 15 08/25/2022    AST 24 05/21/2021    ALT 39 08/25/2022    ALT 52 05/21/2021       CBC RESULTS:  Lab Results   Component Value Date    WBC 7.3 09/27/2022    WBC 6.3 05/21/2021    RBC 5.13 09/27/2022    RBC 5.32 05/21/2021    HGB 14.2 09/27/2022    HGB 14.4 05/21/2021    HCT 44.0 09/27/2022    HCT 44.0 05/21/2021    MCV 86 09/27/2022    MCV 83 05/21/2021    MCH 27.7 09/27/2022    MCH 27.1 05/21/2021    MCHC 32.3 09/27/2022    MCHC 32.7 05/21/2021    RDW 12.9 09/27/2022    RDW 12.8 05/21/2021     " 09/27/2022     05/21/2021       BMP RESULTS:  Lab Results   Component Value Date     09/27/2022     05/21/2021    POTASSIUM 3.9 09/27/2022    POTASSIUM 4.2 05/21/2021    CHLORIDE 103 09/27/2022    CHLORIDE 107 05/21/2021    CO2 29 09/27/2022    CO2 28 05/21/2021    ANIONGAP 7 09/27/2022    ANIONGAP 5 05/21/2021     (H) 09/27/2022    GLC 97 05/21/2021    BUN 11 09/27/2022    BUN 17 05/21/2021    CR 0.88 09/27/2022    CR 1.09 05/21/2021    GFRESTIMATED >90 09/27/2022    GFRESTIMATED 75 05/21/2021    GFRESTBLACK 87 05/21/2021    PRAVEEN 9.2 09/27/2022    PRAVEEN 9.4 05/21/2021        A1C RESULTS:  Lab Results   Component Value Date    A1C 5.6 05/21/2021       THYROID RESULTS:  Lab Results   Component Value Date    TSH 2.89 09/09/2022       Component      Latest Ref Rng 4/17/2020  5:41 PM 4/28/2023  4:37 PM   D-Dimer      0.0 - 0.50 ug/ml FEU 0.3     D-Dimer Quantitative      0.00 - 0.50 ug/mL FEU  0.29            Procedures:   VENOUS ULTRASOUND RIGHT LEG  5/9/2023 4:17 PM      HISTORY: 6 month follow-up DVT; Acute deep vein thrombosis (DVT) of  proximal vein of right lower extremity (H)     COMPARISON: None.     FINDINGS:  Examination of the deep veins with graded compression and  color flow Doppler with spectral wave form analysis was performed.   There is been significant interval reduction in DVT in the right lower  extremity when compared to the prior exam.     There is scarring versus wall adherent thrombus on the wall in the mid  right superficial femoral vein. There is chronic nonocclusive thrombus  in the right popliteal vein. The remaining deep veins of the right  lower extremity are patent.                                                                      IMPRESSION: Significant interval improvement in DVT as above.      EXAM: US LOWER EXTREMITY VENOUS DUPLEX RIGHT  LOCATION: [facility]  DATE/TIME: 9/26/2022 8:23 PM     INDICATION: leg swelling  COMPARISON:  None.  TECHNIQUE: Venous Duplex ultrasound of the right lower extremity with and without compression, augmentation and duplex. Color flow and spectral Doppler with waveform analysis performed.     FINDINGS: Exam includes the common femoral, femoral, popliteal, and contralateral common femoral veins as well as segmentally visualized deep calf veins and greater saphenous vein.      RIGHT: Nonocclusive clot identified in the right common femoral vein which becomes occlusive in the mid superficial femoral vein extending into the posterior tibial vein, peroneal vein, and gastrocnemius veins. The external iliac vein appears to be   patent. No superficial thrombophlebitis. No popliteal cyst.                                                                      IMPRESSION:  1.  Extensive DVT identified within the right lower extremity.     2.  These findings were discussed with Dr. Baird at the time of this dictation.      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   -Doing well on Xarelto. D.dimer has normalized. Venous duplex is yet to be done.   -Wearing thigh high compression socks.      Recommendations:   -Continue Xarelto for now as he still has some thrombus present on ultrasound and he is planning on flying overseas in 2 months. Will drop the dose to chronic maintenance dosing as he has now completed 6 months at therapeutic dosing.   -Continue to wear compression socks (20-30 mmHg thigh high) during the day. He should wear these for the next several years to prevent post-thrombotic syndrome in the future.   -Get a right lower extremity venous duplex and d.dimer in 3 months. If this shows improvement/resorption of thrombus, can consider stopping Xarelto.   -No hypercoagulable  work-up is needed at this time as this is his first lifetime VTE event, he has no family history of clotting, and this appears to be provoked.   -Activity as tolerated.   -Hold off on colonoscopy until we can safely stop Xarelto.         Valencia Amaro PA-C      25 minutes spent on the date of the encounter doing chart review, history and exam, documentation, and further activities as noted above.

## 2023-05-09 NOTE — PROGRESS NOTES
"Patient is here to discuss follow up    /69 (BP Location: Right arm, Patient Position: Chair, Cuff Size: Adult Large)   Pulse 74   Ht 6' 4\" (1.93 m)   Wt 229 lb 3.2 oz (104 kg)   SpO2 94%   BMI 27.90 kg/m      Questions patient would like addressed today are: N/A.    Refills are needed: No    Has homecare services and agency name:  Nevin DELACRUZ    "

## 2023-06-01 ENCOUNTER — HEALTH MAINTENANCE LETTER (OUTPATIENT)
Age: 58
End: 2023-06-01

## 2023-09-07 DIAGNOSIS — F41.1 GENERALIZED ANXIETY DISORDER: ICD-10-CM

## 2023-09-07 DIAGNOSIS — G62.9 NEUROPATHY: ICD-10-CM

## 2023-09-07 RX ORDER — DULOXETIN HYDROCHLORIDE 20 MG/1
20 CAPSULE, DELAYED RELEASE ORAL DAILY
Qty: 90 CAPSULE | Refills: 0 | Status: SHIPPED | OUTPATIENT
Start: 2023-09-07 | End: 2023-12-05

## 2023-09-08 NOTE — TELEPHONE ENCOUNTER
"Last Written Prescription Date:  9/9/2022  Last Fill Quantity: 90,  # refills: 3   Last office visit provider:  11/23/2022 virtual visit with PCP Dr MOISES Joseph      Requested Prescriptions   Pending Prescriptions Disp Refills    DULoxetine (CYMBALTA) 20 MG capsule 90 capsule 3     Sig: Take 1 capsule (20 mg) by mouth daily       Serotonin-Norepinephrine Reuptake Inhibitors  Passed - 9/7/2023  5:05 PM        Passed - Blood pressure under 140/90 in past 12 months     BP Readings from Last 3 Encounters:   05/09/23 106/69   05/02/23 116/74   10/05/22 123/81                 Passed - Recent (12 mo) or future (30 days) visit within the authorizing provider's specialty     Patient has had an office visit with the authorizing provider or a provider within the authorizing providers department within the previous 12 mos or has a future within next 30 days. See \"Patient Info\" tab in inbasket, or \"Choose Columns\" in Meds & Orders section of the refill encounter.              Passed - Medication is active on med list        Passed - Patient is age 18 or older             Ariana Bustamante RN 09/07/23 9:18 PM  "

## 2023-09-22 ENCOUNTER — LAB (OUTPATIENT)
Dept: LAB | Facility: CLINIC | Age: 58
End: 2023-09-22
Payer: COMMERCIAL

## 2023-09-22 DIAGNOSIS — I82.4Y1 ACUTE DEEP VEIN THROMBOSIS (DVT) OF PROXIMAL VEIN OF RIGHT LOWER EXTREMITY (H): ICD-10-CM

## 2023-09-22 PROCEDURE — 85379 FIBRIN DEGRADATION QUANT: CPT

## 2023-09-22 PROCEDURE — 36415 COLL VENOUS BLD VENIPUNCTURE: CPT

## 2023-09-23 LAB — D DIMER PPP FEU-MCNC: 0.34 UG/ML FEU (ref 0–0.5)

## 2023-09-29 ENCOUNTER — ANCILLARY PROCEDURE (OUTPATIENT)
Dept: ULTRASOUND IMAGING | Facility: CLINIC | Age: 58
End: 2023-09-29
Attending: PHYSICIAN ASSISTANT
Payer: COMMERCIAL

## 2023-09-29 ENCOUNTER — DOCUMENTATION ONLY (OUTPATIENT)
Dept: INTERNAL MEDICINE | Facility: CLINIC | Age: 58
End: 2023-09-29

## 2023-09-29 DIAGNOSIS — I82.4Y1 ACUTE DEEP VEIN THROMBOSIS (DVT) OF PROXIMAL VEIN OF RIGHT LOWER EXTREMITY (H): ICD-10-CM

## 2023-09-29 PROCEDURE — 93971 EXTREMITY STUDY: CPT | Mod: RT

## 2023-09-29 NOTE — PROGRESS NOTES
Gualberto Smith has an upcoming lab appointment.        Future Appointments   Date Time Provider Department Center   9/29/2023  3:10 PM [facility]   9/29/2023  3:45 PM [facility]   10/4/2023 11:40 AM Junito Corona MD [facility]       The appointment note says: lab    There is no Lab order available.      Please review and place future orders as appropriate.  If no Lab order will be placed, please advise patient.      Also for consideration: [facility]MP[facility]    Health Maintenance Due   Topic    PSA     ANNUAL REVIEW OF HM ORDERS     VITAMIN D        Thanks,    Linda Peralta

## 2023-09-29 NOTE — PROGRESS NOTES
Attempted to contact patient to determine which labs patient had an appointment to have drawn today. No answer, unable to leave message to return call.       Per chart review,    Patient saw vascular and was advised to have a d- dimer drawn in September. D-Dimer was collected 09/22/23.      When patient returns call- please clarify which labs he was expecting to have drawn at today's appointment.

## 2023-10-04 ENCOUNTER — OFFICE VISIT (OUTPATIENT)
Dept: OTHER | Facility: CLINIC | Age: 58
End: 2023-10-04
Attending: INTERNAL MEDICINE
Payer: COMMERCIAL

## 2023-10-04 VITALS
HEIGHT: 76 IN | BODY MASS INDEX: 28.71 KG/M2 | SYSTOLIC BLOOD PRESSURE: 135 MMHG | DIASTOLIC BLOOD PRESSURE: 87 MMHG | HEART RATE: 75 BPM | OXYGEN SATURATION: 97 % | WEIGHT: 235.8 LBS

## 2023-10-04 DIAGNOSIS — I82.4Y1 ACUTE DEEP VEIN THROMBOSIS (DVT) OF PROXIMAL VEIN OF RIGHT LOWER EXTREMITY (H): ICD-10-CM

## 2023-10-04 DIAGNOSIS — R59.1 LYMPHADENOPATHY: Primary | ICD-10-CM

## 2023-10-04 PROCEDURE — 99214 OFFICE O/P EST MOD 30 MIN: CPT | Performed by: INTERNAL MEDICINE

## 2023-10-04 PROCEDURE — 99213 OFFICE O/P EST LOW 20 MIN: CPT | Performed by: INTERNAL MEDICINE

## 2023-10-04 NOTE — PROGRESS NOTES
[facility]  VASCULAR MEDICINE FOLLOW-UP VISIT        PRIMARY HEALTH CARE PROVIDER:  Ede Joseph     REASON FOR VISIT:  6 month follow-up RLE DVT.         HPI: Gualberto Smith is a very pleasant 58 year old male who presented to the ER on 9/26/22 with right lower extremity swelling and pain. Venous duplex was notable for non-occlusive DVT in the right common femoral vein, which then becomes occlusive in the mid superficial femoral vein and extends through the calf veins. He was admitted and started on IV heparin. IR consulted on him and no thrombectomy was indicated. He was ultimately transitioned to Xarelto and discharged.      He is doing well. He is tolerating Xarelto. He has been wearing compression socks regularly. They did start him on Amlodipine in the hospital for hypertension. Blood pressure is now good. He has completed 6 months of anticoagulation. D.dimer has normalized. Unfortunately, he got confused about his ultrasound appointment time and missed it. He now had his ultrasound completed and presents to discuss results.      This was his first lifetime thromboembolic event. No family history of blood clots. He was told that his father had thick blood and he would get frequent lab draws. He was very sedentary for about one week in 8/2022 due to back pain. It was at this time that he actually thinks he had COVID for a second time as he and his wife were sick and he slept for 20 out of 24 hours a day. He also went on a long car ride in September to [place] and [facility], but his symptoms had started before his trip. PSA is normal. He is due for a colonoscopy due to prior polyps.     He is going in July to [place] with his daughter.         PAST MEDICAL HISTORY  Past Medical History        Past Medical History:   Diagnosis Date    NO ACTIVE PROBLEMS              PAST SURGICAL HISTORY:  Past Surgical History         Past Surgical History:   Procedure Laterality Date    NO HISTORY OF SURGERY  Telephone call made to the patient's home/cell phone number with assistance from [employer]  # [phone]. There was no answer, so a generic message was left to please call the [facility] office about lab results.  Eliazar Chan RN                   CURRENT MEDICATIONS  acetaminophen (TYLENOL) 325 MG tablet, Take 2 tablets (650 mg) by mouth every 6 hours as needed for mild pain or other (and adjunct with moderate or severe pain or per patient request)  acyclovir (ZOVIRAX) 400 MG tablet, TAKE 1 TABLET BY MOUTH THREE TIMES DAILY for 5 days as needed for cold sore.  allopurinol (ZYLOPRIM) 300 MG tablet, Take 1 tablet (300 mg) by mouth daily  amLODIPine (NORVASC) 10 MG tablet, Take 1 tablet (10 mg) by mouth daily  DULoxetine (CYMBALTA) 20 MG capsule, Take 1 capsule (20 mg) by mouth daily  finasteride (PROPECIA) 1 MG tablet, Take 1 mg by mouth daily Keeps brand, for hair growth  multivitamin w/minerals (THERA-VIT-M) tablet, Take 1 tablet by mouth daily  Omega-3 Fatty Acids (FISH OIL PO), Take 1 capsule by mouth daily Unspecified capsule strength  rivaroxaban ANTICOAGULANT (XARELTO) 20 MG TABS tablet, Take 1 tablet (20 mg) by mouth daily (with dinner)  sildenafil (VIAGRA) 50 MG tablet, Take 1 tablet (50 mg) by mouth daily as needed (erectile dysfunction) 30-60 minutes before intercourse  topiramate (TOPAMAX) 25 MG tablet, Take 1 tablet (25 mg) by mouth 2 times daily as needed (nerve pain)  VITAMIN E PO, Take 1 capsule by mouth daily Unspecified capsule strength     No current facility-administered medications on file prior to visit.        ALLERGIES          Allergies   Allergen Reactions    Gabapentin Rash         FAMILY HISTORY  Family History         Family History   Problem Relation Age of Onset    Cancer Mother           lung,brain    Cerebrovascular Disease Father           70's    Hypertension Father      Respiratory Father      Heart Disease Maternal Grandfather      Cancer Sister           two of his sisters diagnosed with skin cancer-melanoma            SOCIAL HISTORY  Social History            Socioeconomic History    Marital status:        Spouse name: Not on file    Number of children: 2    Years of education: Not on file     "Highest education level: Not on file   Occupational History    Occupation: Business       Employer: [employer]       Employer: [employer]   Tobacco Use    Smoking status: Never    Smokeless tobacco: Never   Vaping Use    Vaping status: Not on file   Substance and Sexual Activity    Alcohol use: Yes       Comment: weekends    Drug use: No    Sexual activity: Yes       Partners: Female   Other Topics Concern    Parent/sibling w/ CABG, MI or angioplasty before 65F 55M? No   Social History Narrative     , 2 children                        ROS:   Complete ROS negative other than what is stated in HPI.      EXAM:  /69 (BP Location: Right arm, Patient Position: Chair, Cuff Size: Adult Large)   Pulse 74   Ht 6' 4\" (1.93 m)   Wt 229 lb 3.2 oz (104 kg)   SpO2 94%   BMI 27.90 kg/m    In general, the patient is a pleasant male in no apparent distress.    HEENT: NC/AT.  PERRLA.  EOMI.  Sclerae white, not injected.    Heart: RRR. Normal S1, S2 splits physiologically. No murmur, rub, click, or gallop.   Lungs: CTA.  No ronchi, wheezes, rales.    Abdomen: Soft, nontender, nondistended.   Extremities: No clubbing, cyanosis, or edema. Compression socks are on.  No wounds.      Labs:  LIPID RESULTS:        Lab Results   Component Value Date     CHOL 240 (H) 03/11/2022     CHOL 217 (H) 05/21/2021     HDL 44 03/11/2022     HDL 44 05/21/2021      (H) 03/11/2022      (H) 05/21/2021     TRIG 115 03/11/2022     TRIG 144 05/21/2021     CHOLHDLRATIO 4.6 10/27/2014         LIVER ENZYME RESULTS:        Lab Results   Component Value Date     AST 15 08/25/2022     AST 24 05/21/2021     ALT 39 08/25/2022     ALT 52 05/21/2021         CBC RESULTS:        Lab Results   Component Value Date     WBC 7.3 09/27/2022     WBC 6.3 05/21/2021     RBC 5.13 09/27/2022     RBC 5.32 05/21/2021     HGB 14.2 09/27/2022     HGB 14.4 05/21/2021     HCT 44.0 09/27/2022     HCT 44.0 05/21/2021     MCV 86 09/27/2022 "     MCV 83 05/21/2021     MCH 27.7 09/27/2022     MCH 27.1 05/21/2021     MCHC 32.3 09/27/2022     MCHC 32.7 05/21/2021     RDW 12.9 09/27/2022     RDW 12.8 05/21/2021      09/27/2022      05/21/2021         BMP RESULTS:        Lab Results   Component Value Date      09/27/2022      05/21/2021     POTASSIUM 3.9 09/27/2022     POTASSIUM 4.2 05/21/2021     CHLORIDE 103 09/27/2022     CHLORIDE 107 05/21/2021     CO2 29 09/27/2022     CO2 28 05/21/2021     ANIONGAP 7 09/27/2022     ANIONGAP 5 05/21/2021      (H) 09/27/2022     GLC 97 05/21/2021     BUN 11 09/27/2022     BUN 17 05/21/2021     CR 0.88 09/27/2022     CR 1.09 05/21/2021     GFRESTIMATED >90 09/27/2022     GFRESTIMATED 75 05/21/2021     GFRESTBLACK 87 05/21/2021     PRAVEEN 9.2 09/27/2022     PRAVEEN 9.4 05/21/2021         A1C RESULTS:        Lab Results   Component Value Date     A1C 5.6 05/21/2021         THYROID RESULTS:        Lab Results   Component Value Date     TSH 2.89 09/09/2022         Component      Latest Ref Rng 4/17/2020  5:41 PM 4/28/2023  4:37 PM   D-Dimer      0.0 - 0.50 ug/ml FEU 0.3      D-Dimer Quantitative      0.00 - 0.50 ug/mL FEU   0.29               Procedures:   VENOUS ULTRASOUND RIGHT LEG  5/9/2023 4:17 PM      HISTORY: 6 month follow-up DVT; Acute deep vein thrombosis (DVT) of  proximal vein of right lower extremity (H)     COMPARISON: None.     FINDINGS:  Examination of the deep veins with graded compression and  color flow Doppler with spectral wave form analysis was performed.   There is been significant interval reduction in DVT in the right lower  extremity when compared to the prior exam.     There is scarring versus wall adherent thrombus on the wall in the mid  right superficial femoral vein. There is chronic nonocclusive thrombus  in the right popliteal vein. The remaining deep veins of the right  lower extremity are patent.                                                                       IMPRESSION: Significant interval improvement in DVT as above.        EXAM: US LOWER EXTREMITY VENOUS DUPLEX RIGHT  LOCATION: [facility]  DATE/TIME: 9/26/2022 8:23 PM     INDICATION: leg swelling  COMPARISON: None.  TECHNIQUE: Venous Duplex ultrasound of the right lower extremity with and without compression, augmentation and duplex. Color flow and spectral Doppler with waveform analysis performed.     FINDINGS: Exam includes the common femoral, femoral, popliteal, and contralateral common femoral veins as well as segmentally visualized deep calf veins and greater saphenous vein.      RIGHT: Nonocclusive clot identified in the right common femoral vein which becomes occlusive in the mid superficial femoral vein extending into the posterior tibial vein, peroneal vein, and gastrocnemius veins. The external iliac vein appears to be   patent. No superficial thrombophlebitis. No popliteal cyst.                                                                      IMPRESSION:  1.  Extensive DVT identified within the right lower extremity.     2.  These findings were discussed with Dr. Baird at the time of this dictation.      Narrative & Impression   EXAM: US LOWER EXTREMITY VENOUS DUPLEX RIGHT  LOCATION: [facility]  DATE: 9/29/2023     INDICATION: Follow up RLE DVT  COMPARISON: 05/09/2023  TECHNIQUE: Venous Duplex ultrasound of the right lower extremity with and without compression, augmentation and duplex. Color flow and spectral Doppler with waveform analysis performed.     FINDINGS: Exam includes the common femoral, femoral, popliteal, and contralateral common femoral veins as well as segmentally visualized deep calf veins and greater saphenous vein.      RIGHT: Unchanged nonocclusive thrombus within the right popliteal vein. Unchanged scarring versus adherent wall thrombus within the mid right femoral vein. No popliteal cyst. 3.4 x 2.3 x 0.8 cm lymph node  along the right groin is nonspecific and may be   reactive.                                                                      IMPRESSION:  1.  No significant change in appearance of deep venous thrombosis in the right lower extremity.           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   -Doing well on Xarelto. D dimer has normalized. Venous duplex reveals unchanged nonocclusive thrombus within the right popliteal vein. Unchanged scarring versus adherent wall thrombus is noted within the mid right femoral vein. No popliteal cyst. 3.4 x 2.3 x 0.8 cm lymph node along the right groin is nonspecific and may be reactive.   -Wearing thigh high compression socks.      Recommendations:   -Check LDH now given LA. Virtual f/u visit one week later.  If normal, given d dimer normality and no change in chronic thrombosis will stop Xarelto and check d dimer one month later to insure it is safe to remain off of AC.   -Continue to wear compression socks (20-30 mmHg thigh high) during the day. He should wear these for the next several years to prevent post-thrombotic syndrome in the future.   -No hypercoagulable work-up is needed at this time as this is his first lifetime VTE event, he has no family history of clotting, and this appears to be provoked.   -Activity as tolerated.   -Hold off on colonoscopy until we can safely stop Xarelto.       Junito Corona MD          30 minutes spent on the date of the encounter doing chart review, history and exam, documentation, and further activities as noted above.

## 2023-10-04 NOTE — PROGRESS NOTES
"Patient is here to discuss follow up    /87 (BP Location: Right arm, Patient Position: Chair, Cuff Size: Adult Regular)   Pulse 75   Ht 6' 4\" (1.93 m)   Wt 235 lb 12.8 oz (107 kg)   SpO2 97%   BMI 28.70 kg/m      Questions patient would like addressed today are: N/A.    Refills are needed: No    Has homecare services and agency name:  Nevin DELACRUZ    "

## 2023-10-10 ENCOUNTER — TELEPHONE (OUTPATIENT)
Dept: OTHER | Facility: CLINIC | Age: 58
End: 2023-10-10
Payer: COMMERCIAL

## 2023-10-12 ENCOUNTER — LAB (OUTPATIENT)
Dept: LAB | Facility: CLINIC | Age: 58
End: 2023-10-12
Payer: COMMERCIAL

## 2023-10-12 DIAGNOSIS — R59.1 LYMPHADENOPATHY: ICD-10-CM

## 2023-10-12 LAB — LDH SERPL L TO P-CCNC: 174 U/L (ref 0–250)

## 2023-10-12 PROCEDURE — 83615 LACTATE (LD) (LDH) ENZYME: CPT

## 2023-10-12 PROCEDURE — 36415 COLL VENOUS BLD VENIPUNCTURE: CPT

## 2023-10-12 NOTE — TELEPHONE ENCOUNTER
Left voicemail with instructions for patient to call back to schedule their appointment(s)    October 12, 2023 , 8:24 AM

## 2023-10-12 NOTE — TELEPHONE ENCOUNTER
Future Appointments   Date Time Provider Department Center   10/12/2023  2:30 PM [facility]   10/17/2023  4:30 PM Junito Corona MD [facility]

## 2023-10-17 ENCOUNTER — VIRTUAL VISIT (OUTPATIENT)
Dept: OTHER | Facility: CLINIC | Age: 58
End: 2023-10-17
Attending: INTERNAL MEDICINE
Payer: COMMERCIAL

## 2023-10-17 DIAGNOSIS — I82.4Y1 ACUTE DEEP VEIN THROMBOSIS (DVT) OF PROXIMAL VEIN OF RIGHT LOWER EXTREMITY (H): ICD-10-CM

## 2023-10-17 DIAGNOSIS — Z86.718 H/O DEEP VENOUS THROMBOSIS: Primary | ICD-10-CM

## 2023-10-17 DIAGNOSIS — R59.1 LYMPHADENOPATHY: ICD-10-CM

## 2023-10-17 PROCEDURE — 99443 PR PHYSICIAN TELEPHONE EVALUATION 21-30 MIN: CPT | Performed by: INTERNAL MEDICINE

## 2023-10-17 NOTE — PROGRESS NOTES
Gualberto is a 58 year old who is being evaluated via a billable telephone visit.      What phone number would you like to be contacted at? [phone]  How would you like to obtain your AVS? Chris    Distant Location (provider location):  On-site      Objective           Vitals:  No vitals were obtained today due to virtual visit.    LEIGHANN DELACRUZ

## 2023-10-17 NOTE — PROGRESS NOTES
[facility]  VASCULAR MEDICINE FOLLOW-UP VISIT        PRIMARY HEALTH CARE PROVIDER:  Ede Joseph     REASON FOR VISIT:  6 month follow-up RLE DVT.         HPI: Gualberto Smith is a very pleasant 58 year old male who presented to the ER on 9/26/22 with right lower extremity swelling and pain. Venous duplex was notable for non-occlusive DVT in the right common femoral vein, which then becomes occlusive in the mid superficial femoral vein and extends through the calf veins. He was admitted and started on IV heparin. IR consulted on him and no thrombectomy was indicated. He was ultimately transitioned to Xarelto and discharged.      He is doing well. He is tolerating Xarelto. He has been wearing compression socks regularly. They did start him on Amlodipine in the hospital for hypertension. Blood pressure is now good. He has completed 6 months of anticoagulation. D.dimer has normalized. Unfortunately, he got confused about his ultrasound appointment time and missed it. He now had his ultrasound completed and presents to discuss results.      This was his first lifetime thromboembolic event. No family history of blood clots. He was told that his father had thick blood and he would get frequent lab draws. He was very sedentary for about one week in 8/2022 due to back pain. It was at this time that he actually thinks he had COVID for a second time as he and his wife were sick and he slept for 20 out of 24 hours a day. He also went on a long car ride in September to [place] and [facility], but his symptoms had started before his trip. PSA is normal. He is due for a colonoscopy due to prior polyps.     He is going in July to [place] with his daughter.         PAST MEDICAL HISTORY  Past Medical History           Past Medical History:   Diagnosis Date    NO ACTIVE PROBLEMS              PAST SURGICAL HISTORY:  Past Surgical History             Past Surgical History:   Procedure Laterality Date    NO HISTORY OF  SURGERY                   CURRENT MEDICATIONS  acetaminophen (TYLENOL) 325 MG tablet, Take 2 tablets (650 mg) by mouth every 6 hours as needed for mild pain or other (and adjunct with moderate or severe pain or per patient request)  acyclovir (ZOVIRAX) 400 MG tablet, TAKE 1 TABLET BY MOUTH THREE TIMES DAILY for 5 days as needed for cold sore.  allopurinol (ZYLOPRIM) 300 MG tablet, Take 1 tablet (300 mg) by mouth daily  amLODIPine (NORVASC) 10 MG tablet, Take 1 tablet (10 mg) by mouth daily  DULoxetine (CYMBALTA) 20 MG capsule, Take 1 capsule (20 mg) by mouth daily  finasteride (PROPECIA) 1 MG tablet, Take 1 mg by mouth daily Keeps brand, for hair growth  multivitamin w/minerals (THERA-VIT-M) tablet, Take 1 tablet by mouth daily  Omega-3 Fatty Acids (FISH OIL PO), Take 1 capsule by mouth daily Unspecified capsule strength  rivaroxaban ANTICOAGULANT (XARELTO) 20 MG TABS tablet, Take 1 tablet (20 mg) by mouth daily (with dinner)  sildenafil (VIAGRA) 50 MG tablet, Take 1 tablet (50 mg) by mouth daily as needed (erectile dysfunction) 30-60 minutes before intercourse  topiramate (TOPAMAX) 25 MG tablet, Take 1 tablet (25 mg) by mouth 2 times daily as needed (nerve pain)  VITAMIN E PO, Take 1 capsule by mouth daily Unspecified capsule strength     No current facility-administered medications on file prior to visit.        ALLERGIES             Allergies   Allergen Reactions    Gabapentin Rash         FAMILY HISTORY  Family History             Family History   Problem Relation Age of Onset    Cancer Mother           lung,brain    Cerebrovascular Disease Father           70's    Hypertension Father      Respiratory Father      Heart Disease Maternal Grandfather      Cancer Sister           two of his sisters diagnosed with skin cancer-melanoma            SOCIAL HISTORY  Social History                Socioeconomic History    Marital status:        Spouse name: Not on file    Number of children: 2    Years of education:  "Not on file    Highest education level: Not on file   Occupational History    Occupation: Business       Employer: [employer]       Employer: [employer]   Tobacco Use    Smoking status: Never    Smokeless tobacco: Never   Vaping Use    Vaping status: Not on file   Substance and Sexual Activity    Alcohol use: Yes       Comment: weekends    Drug use: No    Sexual activity: Yes       Partners: Female   Other Topics Concern    Parent/sibling w/ CABG, MI or angioplasty before 65F 55M? No   Social History Narrative     , 2 children                        ROS:   Complete ROS negative other than what is stated in HPI.      EXAM:  /69 (BP Location: Right arm, Patient Position: Chair, Cuff Size: Adult Large)   Pulse 74   Ht 6' 4\" (1.93 m)   Wt 229 lb 3.2 oz (104 kg)   SpO2 94%   BMI 27.90 kg/m    In general, the patient is a pleasant male in no apparent distress.    HEENT: NC/AT.  PERRLA.  EOMI.  Sclerae white, not injected.    Heart: RRR. Normal S1, S2 splits physiologically. No murmur, rub, click, or gallop.   Lungs: CTA.  No ronchi, wheezes, rales.    Abdomen: Soft, nontender, nondistended.   Extremities: No clubbing, cyanosis, or edema. Compression socks are on.  No wounds.      Labs:  LIPID RESULTS:            Lab Results   Component Value Date     CHOL 240 (H) 03/11/2022     CHOL 217 (H) 05/21/2021     HDL 44 03/11/2022     HDL 44 05/21/2021      (H) 03/11/2022      (H) 05/21/2021     TRIG 115 03/11/2022     TRIG 144 05/21/2021     CHOLHDLRATIO 4.6 10/27/2014         LIVER ENZYME RESULTS:            Lab Results   Component Value Date     AST 15 08/25/2022     AST 24 05/21/2021     ALT 39 08/25/2022     ALT 52 05/21/2021         CBC RESULTS:            Lab Results   Component Value Date     WBC 7.3 09/27/2022     WBC 6.3 05/21/2021     RBC 5.13 09/27/2022     RBC 5.32 05/21/2021     HGB 14.2 09/27/2022     HGB 14.4 05/21/2021     HCT 44.0 09/27/2022     HCT 44.0 " 05/21/2021     MCV 86 09/27/2022     MCV 83 05/21/2021     MCH 27.7 09/27/2022     MCH 27.1 05/21/2021     MCHC 32.3 09/27/2022     MCHC 32.7 05/21/2021     RDW 12.9 09/27/2022     RDW 12.8 05/21/2021      09/27/2022      05/21/2021         BMP RESULTS:            Lab Results   Component Value Date      09/27/2022      05/21/2021     POTASSIUM 3.9 09/27/2022     POTASSIUM 4.2 05/21/2021     CHLORIDE 103 09/27/2022     CHLORIDE 107 05/21/2021     CO2 29 09/27/2022     CO2 28 05/21/2021     ANIONGAP 7 09/27/2022     ANIONGAP 5 05/21/2021      (H) 09/27/2022     GLC 97 05/21/2021     BUN 11 09/27/2022     BUN 17 05/21/2021     CR 0.88 09/27/2022     CR 1.09 05/21/2021     GFRESTIMATED >90 09/27/2022     GFRESTIMATED 75 05/21/2021     GFRESTBLACK 87 05/21/2021     PRAVEEN 9.2 09/27/2022     PRAVEEN 9.4 05/21/2021         A1C RESULTS:            Lab Results   Component Value Date     A1C 5.6 05/21/2021         THYROID RESULTS:            Lab Results   Component Value Date     TSH 2.89 09/09/2022         Component      Latest Ref Rng 4/17/2020  5:41 PM 4/28/2023  4:37 PM   D-Dimer      0.0 - 0.50 ug/ml FEU 0.3      D-Dimer Quantitative      0.00 - 0.50 ug/mL FEU   0.29               Procedures:   VENOUS ULTRASOUND RIGHT LEG  5/9/2023 4:17 PM      HISTORY: 6 month follow-up DVT; Acute deep vein thrombosis (DVT) of  proximal vein of right lower extremity (H)     COMPARISON: None.     FINDINGS:  Examination of the deep veins with graded compression and  color flow Doppler with spectral wave form analysis was performed.   There is been significant interval reduction in DVT in the right lower  extremity when compared to the prior exam.     There is scarring versus wall adherent thrombus on the wall in the mid  right superficial femoral vein. There is chronic nonocclusive thrombus  in the right popliteal vein. The remaining deep veins of the right  lower extremity are patent.                                                                       IMPRESSION: Significant interval improvement in DVT as above.        EXAM: US LOWER EXTREMITY VENOUS DUPLEX RIGHT  LOCATION: [facility]  DATE/TIME: 9/26/2022 8:23 PM     INDICATION: leg swelling  COMPARISON: None.  TECHNIQUE: Venous Duplex ultrasound of the right lower extremity with and without compression, augmentation and duplex. Color flow and spectral Doppler with waveform analysis performed.     FINDINGS: Exam includes the common femoral, femoral, popliteal, and contralateral common femoral veins as well as segmentally visualized deep calf veins and greater saphenous vein.      RIGHT: Nonocclusive clot identified in the right common femoral vein which becomes occlusive in the mid superficial femoral vein extending into the posterior tibial vein, peroneal vein, and gastrocnemius veins. The external iliac vein appears to be   patent. No superficial thrombophlebitis. No popliteal cyst.                                                                      IMPRESSION:  1.  Extensive DVT identified within the right lower extremity.     2.  These findings were discussed with Dr. Baird at the time of this dictation.        Narrative & Impression   EXAM: US LOWER EXTREMITY VENOUS DUPLEX RIGHT  LOCATION: [facility]  DATE: 9/29/2023     INDICATION: Follow up RLE DVT  COMPARISON: 05/09/2023  TECHNIQUE: Venous Duplex ultrasound of the right lower extremity with and without compression, augmentation and duplex. Color flow and spectral Doppler with waveform analysis performed.     FINDINGS: Exam includes the common femoral, femoral, popliteal, and contralateral common femoral veins as well as segmentally visualized deep calf veins and greater saphenous vein.      RIGHT: Unchanged nonocclusive thrombus within the right popliteal vein. Unchanged scarring versus adherent wall thrombus within the mid right femoral vein. No popliteal  cyst. 3.4 x 2.3 x 0.8 cm lymph node along the right groin is nonspecific and may be   reactive.                                                                      IMPRESSION:  1.  No significant change in appearance of deep venous thrombosis in the right lower extremity.            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   -Doing well on Xarelto. D dimer has normalized. Venous duplex reveals unchanged nonocclusive thrombus within the right popliteal vein. Unchanged scarring versus adherent wall thrombus is noted within the mid right femoral vein. No popliteal cyst. 3.4 x 2.3 x 0.8 cm lymph node along the right groin is nonspecific and may be reactive.   -Wearing thigh high compression socks.   -We checked LDH given LA, and it was normal.     Recommendations:   -Given d dimer normality and no change in chronic thrombosis will stop Xarelto in three weeks after a trip to [place] and check d dimer  one month later to insure it is safe to remain off of AC. Virtual f/u two days after d dimer draw.  -Continue to wear compression socks (20-30 mmHg thigh high) during the day. He should wear these for the next several years to prevent post-thrombotic syndrome in the future.   -No hypercoagulable work-up is needed at this time as this is his first lifetime VTE event, he has no family history of clotting, and this appears to be provoked.   -Activity as tolerated.   -Hold off on colonoscopy until we can safely stop Xarelto.       Junito Corona MD    21 minutes total medical care on today's date.    MD location: office  Pt location: home    Phone call start: 16:22  Phone call end: 16:43

## 2023-11-07 DIAGNOSIS — I82.4Y1 ACUTE EMBOLISM AND THROMBOSIS OF DEEP VEIN OF RIGHT PROXIMAL LOWER EXTREMITY (H): Primary | ICD-10-CM

## 2023-11-10 ENCOUNTER — TELEPHONE (OUTPATIENT)
Dept: OTHER | Facility: CLINIC | Age: 58
End: 2023-11-10
Payer: COMMERCIAL

## 2023-11-10 NOTE — TELEPHONE ENCOUNTER
PALLIATIVE CARE SOCIAL WORK FOCUS NOTE    Called and lvm for patient's wife Milagro regarding 1100 discharge. Awaiting call back. Set-up ride for Friday, tomorrow 2/21/20 at 1100 with Jael. Hospice Face to Face set-up with MARTINA Ca at 1330. Communicated information to [facility], Juliano Haywood, Attending MD, Kiara Real, ANGELICA and Ella Stephenson RN.     Addendum: Spoke with patient's wife Milagro she is agreeable to all of the above information and aware of 1100 d/c.     Patricia Bourne, MSW, APSW  Palliative Care   [phone]       [facility]    Who is the name of the provider?:  TEQUILA GARRISON   What is the location you see this provider at/preferred location?: [place]  Person calling / Facility: [facility]  Phone number:  [phone]  Nurse call back needed:  YES     Reason for call:  Asking for prescription placed 11/7 for compression stockings to be signed by Dr Garrison    Pharmacy location:     [id number] - [place], [place] - [address] AT [facility] [address]  Outside Imaging: n/a   Can we leave a detailed message on this number?  YES

## 2023-11-10 NOTE — TELEPHONE ENCOUNTER
Compression order signed VORB that was pended in 11/7/23 orders only encounter.    Christel CALIXTO, RN    [facility]  Office: [phone]  Fax: [phone]

## 2023-11-21 DIAGNOSIS — I10 ESSENTIAL HYPERTENSION: ICD-10-CM

## 2023-11-22 RX ORDER — AMLODIPINE BESYLATE 10 MG/1
10 TABLET ORAL DAILY
Qty: 90 TABLET | Refills: 3 | Status: SHIPPED | OUTPATIENT
Start: 2023-11-22

## 2023-11-29 ENCOUNTER — TELEPHONE (OUTPATIENT)
Dept: OTHER | Facility: CLINIC | Age: 58
End: 2023-11-29
Payer: COMMERCIAL

## 2023-12-05 DIAGNOSIS — F41.1 GENERALIZED ANXIETY DISORDER: ICD-10-CM

## 2023-12-05 DIAGNOSIS — G62.9 NEUROPATHY: ICD-10-CM

## 2023-12-05 RX ORDER — DULOXETIN HYDROCHLORIDE 20 MG/1
20 CAPSULE, DELAYED RELEASE ORAL DAILY
Qty: 90 CAPSULE | Refills: 0 | Status: SHIPPED | OUTPATIENT
Start: 2023-12-05 | End: 2024-03-05

## 2023-12-15 ENCOUNTER — LAB (OUTPATIENT)
Dept: LAB | Facility: CLINIC | Age: 58
End: 2023-12-15
Payer: COMMERCIAL

## 2023-12-15 DIAGNOSIS — Z86.718 H/O DEEP VENOUS THROMBOSIS: ICD-10-CM

## 2023-12-15 LAB — D DIMER PPP FEU-MCNC: 0.7 UG/ML FEU (ref 0–0.5)

## 2023-12-15 PROCEDURE — 36415 COLL VENOUS BLD VENIPUNCTURE: CPT

## 2023-12-15 PROCEDURE — 85379 FIBRIN DEGRADATION QUANT: CPT

## 2023-12-18 ENCOUNTER — OFFICE VISIT (OUTPATIENT)
Dept: OTHER | Facility: CLINIC | Age: 58
End: 2023-12-18
Attending: INTERNAL MEDICINE
Payer: COMMERCIAL

## 2023-12-18 VITALS
BODY MASS INDEX: 28.93 KG/M2 | SYSTOLIC BLOOD PRESSURE: 160 MMHG | DIASTOLIC BLOOD PRESSURE: 88 MMHG | HEART RATE: 68 BPM | WEIGHT: 237.6 LBS | OXYGEN SATURATION: 96 % | HEIGHT: 76 IN

## 2023-12-18 DIAGNOSIS — R59.1 LYMPHADENOPATHY: ICD-10-CM

## 2023-12-18 DIAGNOSIS — I82.4Y1 ACUTE DEEP VEIN THROMBOSIS (DVT) OF PROXIMAL VEIN OF RIGHT LOWER EXTREMITY (H): ICD-10-CM

## 2023-12-18 DIAGNOSIS — Z86.718 H/O DEEP VENOUS THROMBOSIS: ICD-10-CM

## 2023-12-18 PROCEDURE — 99214 OFFICE O/P EST MOD 30 MIN: CPT | Performed by: INTERNAL MEDICINE

## 2023-12-18 PROCEDURE — 99213 OFFICE O/P EST LOW 20 MIN: CPT | Performed by: INTERNAL MEDICINE

## 2023-12-18 NOTE — PROGRESS NOTES
[facility]  VASCULAR MEDICINE FOLLOW-UP VISIT        PRIMARY HEALTH CARE PROVIDER:  Ede Joseph     REASON FOR VISIT:  6 month follow-up RLE DVT.         HPI: Gualberto Smith is a very pleasant 58 year old male who presented to the ER on 9/26/22 with right lower extremity swelling and pain. Venous duplex was notable for non-occlusive DVT in the right common femoral vein, which then became occlusive in the mid superficial femoral vein and extended through the calf veins. He was admitted and started on IV heparin. IR consulted on him and no thrombectomy was indicated. He was ultimately transitioned to Xarelto and discharged.      He is doing well. He is tolerating Xarelto. He has been wearing compression socks regularly. He was started on Amlodipine in the hospital for hypertension. Blood pressure is now slightly elevated. He has completed 6 months of anticoagulation. D dimer has normalized. Unfortunately, he got confused about his ultrasound appointment time and missed it. He now had his ultrasound completed and presents to discuss results.      This was his first lifetime thromboembolic event. No family history of blood clots. He was told that his father had thick blood and he would get frequent lab draws. He was very sedentary for about one week in 8/2022 due to back pain. It was at this time that he actually thinks he had COVID for a second time as he and his wife were sick and he slept for 20 out of 24 hours a day. He also went on a long car ride in September to [place] and [facility], but his symptoms had started before his trip. PSA is normal. He is due for a colonoscopy due to prior polyps.       On 10/17/23, given d dimer normality and no change in chronic thrombosis we had him stop Xarelto in early November after a trip to [place]. We did check a d dimer one month later on 12/15/2023 to insure it is safe to remain off of AC. Those results revealed d dimer had increased to 0.70     PAST  MEDICAL HISTORY  Past Medical History           Past Medical History:   Diagnosis Date    NO ACTIVE PROBLEMS              PAST SURGICAL HISTORY:  Past Surgical History             Past Surgical History:   Procedure Laterality Date    NO HISTORY OF SURGERY                   CURRENT MEDICATIONS  acetaminophen (TYLENOL) 325 MG tablet, Take 2 tablets (650 mg) by mouth every 6 hours as needed for mild pain or other (and adjunct with moderate or severe pain or per patient request)  acyclovir (ZOVIRAX) 400 MG tablet, TAKE 1 TABLET BY MOUTH THREE TIMES DAILY for 5 days as needed for cold sore.  allopurinol (ZYLOPRIM) 300 MG tablet, Take 1 tablet (300 mg) by mouth daily  amLODIPine (NORVASC) 10 MG tablet, Take 1 tablet (10 mg) by mouth daily  DULoxetine (CYMBALTA) 20 MG capsule, Take 1 capsule (20 mg) by mouth daily  finasteride (PROPECIA) 1 MG tablet, Take 1 mg by mouth daily Keeps brand, for hair growth  multivitamin w/minerals (THERA-VIT-M) tablet, Take 1 tablet by mouth daily  Omega-3 Fatty Acids (FISH OIL PO), Take 1 capsule by mouth daily Unspecified capsule strength  sildenafil (VIAGRA) 50 MG tablet, Take 1 tablet (50 mg) by mouth daily as needed (erectile dysfunction) 30-60 minutes before intercourse  topiramate (TOPAMAX) 25 MG tablet, Take 1 tablet (25 mg) by mouth 2 times daily as needed (nerve pain)  VITAMIN E PO, Take 1 capsule by mouth daily Unspecified capsule strength     No current facility-administered medications on file prior to visit.        ALLERGIES             Allergies   Allergen Reactions    Gabapentin Rash         FAMILY HISTORY  Family History             Family History   Problem Relation Age of Onset    Cancer Mother           lung,brain    Cerebrovascular Disease Father           70's    Hypertension Father      Respiratory Father      Heart Disease Maternal Grandfather      Cancer Sister           two of his sisters diagnosed with skin cancer-melanoma            SOCIAL HISTORY  Social History     "            Socioeconomic History    Marital status:        Spouse name: Not on file    Number of children: 2    Years of education: Not on file    Highest education level: Not on file   Occupational History    Occupation: Business       Employer: [employer]       Employer: [employer]   Tobacco Use    Smoking status: Never    Smokeless tobacco: Never   Vaping Use    Vaping status: Not on file   Substance and Sexual Activity    Alcohol use: Yes       Comment: weekends    Drug use: No    Sexual activity: Yes       Partners: Female   Other Topics Concern    Parent/sibling w/ CABG, MI or angioplasty before 65F 55M? No   Social History Narrative     , 2 children                        ROS:   Complete ROS negative other than what is stated in HPI.      EXAM:    BP (!) 160/88 (BP Location: Right arm, Patient Position: Chair, Cuff Size: Adult Regular)   Pulse 68   Ht 6' 4\" (1.93 m)   Wt 237 lb 9.6 oz (107.8 kg)   SpO2 96%   BMI 28.92 kg/m        HEENT: NC/AT.  PERRLA.  EOMI.  Sclerae white, not injected.    Heart: RRR. Normal S1, S2 splits physiologically. No murmur, rub, click, or gallop.   Lungs: CTA.  No ronchi, wheezes, rales.    Abdomen: Soft, nontender, nondistended.   Extremities: No clubbing, cyanosis, or edema. Compression socks are on.  No wounds.      Labs:  LIPID RESULTS:            Lab Results   Component Value Date     CHOL 240 (H) 03/11/2022     CHOL 217 (H) 05/21/2021     HDL 44 03/11/2022     HDL 44 05/21/2021      (H) 03/11/2022      (H) 05/21/2021     TRIG 115 03/11/2022     TRIG 144 05/21/2021     CHOLHDLRATIO 4.6 10/27/2014         LIVER ENZYME RESULTS:            Lab Results   Component Value Date     AST 15 08/25/2022     AST 24 05/21/2021     ALT 39 08/25/2022     ALT 52 05/21/2021         CBC RESULTS:            Lab Results   Component Value Date     WBC 7.3 09/27/2022     WBC 6.3 05/21/2021     RBC 5.13 09/27/2022     RBC 5.32 05/21/2021     HGB " 14.2 09/27/2022     HGB 14.4 05/21/2021     HCT 44.0 09/27/2022     HCT 44.0 05/21/2021     MCV 86 09/27/2022     MCV 83 05/21/2021     MCH 27.7 09/27/2022     MCH 27.1 05/21/2021     MCHC 32.3 09/27/2022     MCHC 32.7 05/21/2021     RDW 12.9 09/27/2022     RDW 12.8 05/21/2021      09/27/2022      05/21/2021         BMP RESULTS:            Lab Results   Component Value Date      09/27/2022      05/21/2021     POTASSIUM 3.9 09/27/2022     POTASSIUM 4.2 05/21/2021     CHLORIDE 103 09/27/2022     CHLORIDE 107 05/21/2021     CO2 29 09/27/2022     CO2 28 05/21/2021     ANIONGAP 7 09/27/2022     ANIONGAP 5 05/21/2021      (H) 09/27/2022     GLC 97 05/21/2021     BUN 11 09/27/2022     BUN 17 05/21/2021     CR 0.88 09/27/2022     CR 1.09 05/21/2021     GFRESTIMATED >90 09/27/2022     GFRESTIMATED 75 05/21/2021     GFRESTBLACK 87 05/21/2021     PRAVEEN 9.2 09/27/2022     PRAVEEN 9.4 05/21/2021         A1C RESULTS:            Lab Results   Component Value Date     A1C 5.6 05/21/2021         THYROID RESULTS:            Lab Results   Component Value Date     TSH 2.89 09/09/2022         Component      Latest Ref Rng 4/17/2020  5:41 PM 4/28/2023  4:37 PM   D-Dimer      0.0 - 0.50 ug/ml FEU 0.3      D-Dimer Quantitative      0.00 - 0.50 ug/mL FEU   0.29               Procedures:   VENOUS ULTRASOUND RIGHT LEG  5/9/2023 4:17 PM      HISTORY: 6 month follow-up DVT; Acute deep vein thrombosis (DVT) of  proximal vein of right lower extremity (H)     COMPARISON: None.     FINDINGS:  Examination of the deep veins with graded compression and  color flow Doppler with spectral wave form analysis was performed.   There is been significant interval reduction in DVT in the right lower  extremity when compared to the prior exam.     There is scarring versus wall adherent thrombus on the wall in the mid  right superficial femoral vein. There is chronic nonocclusive thrombus  in the right popliteal vein. The remaining  deep veins of the right  lower extremity are patent.                                                                      IMPRESSION: Significant interval improvement in DVT as above.        EXAM: US LOWER EXTREMITY VENOUS DUPLEX RIGHT  LOCATION: [facility]  DATE/TIME: 9/26/2022 8:23 PM     INDICATION: leg swelling  COMPARISON: None.  TECHNIQUE: Venous Duplex ultrasound of the right lower extremity with and without compression, augmentation and duplex. Color flow and spectral Doppler with waveform analysis performed.     FINDINGS: Exam includes the common femoral, femoral, popliteal, and contralateral common femoral veins as well as segmentally visualized deep calf veins and greater saphenous vein.      RIGHT: Nonocclusive clot identified in the right common femoral vein which becomes occlusive in the mid superficial femoral vein extending into the posterior tibial vein, peroneal vein, and gastrocnemius veins. The external iliac vein appears to be   patent. No superficial thrombophlebitis. No popliteal cyst.                                                                      IMPRESSION:  1.  Extensive DVT identified within the right lower extremity.     2.  These findings were discussed with Dr. Baird at the time of this dictation.        Narrative & Impression   EXAM: US LOWER EXTREMITY VENOUS DUPLEX RIGHT  LOCATION: [facility]  DATE: 9/29/2023     INDICATION: Follow up RLE DVT  COMPARISON: 05/09/2023  TECHNIQUE: Venous Duplex ultrasound of the right lower extremity with and without compression, augmentation and duplex. Color flow and spectral Doppler with waveform analysis performed.     FINDINGS: Exam includes the common femoral, femoral, popliteal, and contralateral common femoral veins as well as segmentally visualized deep calf veins and greater saphenous vein.      RIGHT: Unchanged nonocclusive thrombus within the right popliteal vein. Unchanged  scarring versus adherent wall thrombus within the mid right femoral vein. No popliteal cyst. 3.4 x 2.3 x 0.8 cm lymph node along the right groin is nonspecific and may be   reactive.                                                                      IMPRESSION:  1.  No significant change in appearance of deep venous thrombosis in the right lower extremity.      Component      Latest Ref Rng 4/28/2023  4:37 PM 9/22/2023  5:16 PM 12/15/2023  3:40 PM   D-Dimer Quantitative      0.00 - 0.50 ug/mL FEU 0.29  0.34  0.70 (H)       Legend:  (H) High        Assessment and Plan:   First lifetime thromboembolic event of right lower extremity DVT 10/2022     -This is provoked in the setting of previously being more sedentary due to back issues with possible COVID (he never tested but had symptoms) and a longer car ride to [place].   -Doing well on Xarelto. D dimer has normalized. Venous duplex reveals unchanged nonocclusive thrombus within the right popliteal vein. Unchanged scarring versus adherent wall thrombus is noted within the mid right femoral vein. No popliteal cyst. 3.4 x 2.3 x 0.8 cm lymph node along the right groin is nonspecific and may be reactive.   -Wearing thigh high compression socks.   -We checked LDH given LA, and it was normal.  -On 10/17/23, given d dimer normality and no change in chronic thrombosis we had him stop Xarelto in early November after a trip to [place]. We did check a d dimer one month later on 12/15/2023 to insure it is safe to remain off of AC. Those results revealed d dimer had increased to 0.70  -He is due for a colonoscopy due to prior polyps.     Recommendations:   -Continue to wear compression socks (20-30 mmHg thigh high) during the day. He should wear these for the next several years to prevent post-thrombotic syndrome in the future.   -No hypercoagulable work-up is needed at this time as this is his first lifetime VTE event, he has no family history of clotting, and this appears to  be provoked.   -Activity as tolerated.   -I do not like that his d dimer increased off of AC, albeit only slightly. He is due for a colonoscopy due to prior polyps. I advised he get this done ASAP while off of AC. Repeat d dimer in one month to track trajectory of d dimer. RTC one day after dimer. If goes up dramatically in the next month, then resume AC at next visit. Attempt to get colonoscopy done in the next month. RTC for BP check as well in the next month      Junito Corona MD     33 minutes total medical care on today's date.

## 2023-12-18 NOTE — PROGRESS NOTES
"Patient is here to discuss FOLLOW UP    BP (!) 160/88 (BP Location: Right arm, Patient Position: Chair, Cuff Size: Adult Regular)   Pulse 68   Ht 6' 4\" (1.93 m)   Wt 237 lb 9.6 oz (107.8 kg)   SpO2 96%   BMI 28.92 kg/m      Questions patient would like addressed today are: N/A.    Refills are needed: No    Has homecare services and agency name:  N[facility] DELACRUZ    "

## 2023-12-20 ENCOUNTER — TELEPHONE (OUTPATIENT)
Dept: OTHER | Facility: CLINIC | Age: 58
End: 2023-12-20
Payer: COMMERCIAL

## 2023-12-21 NOTE — TELEPHONE ENCOUNTER
Left voicemail with instructions for patient to call back to schedule their appointment(s)    December 21, 2023 , 8:33 AM

## 2023-12-26 NOTE — TELEPHONE ENCOUNTER
Spoke with patient.  He will call us back when he has his calendar with him.      I told him we would call him back in a couple of days if we don't hear from him.

## 2023-12-27 ENCOUNTER — TELEPHONE (OUTPATIENT)
Dept: OTHER | Facility: CLINIC | Age: 58
End: 2023-12-27
Payer: COMMERCIAL

## 2023-12-27 NOTE — TELEPHONE ENCOUNTER
Returned call to [facility] stating Dr. Corona is not patients PCP and they will need to contact his PCP for this.     Christel CALIXTO, RN    [facility]  Vascular [facility]  Office: [phone]  Fax: [phone]

## 2023-12-27 NOTE — TELEPHONE ENCOUNTER
[facility] VASCULAR [facility]    Who is the name of the provider?:  TEQUILA GARRISON   What is the location you see this provider at/preferred location?: [place]  Person calling / Facility: [facility] (Referral Coord) [place]  Phone number:  [phone]  Nurse call back needed:  yes     Reason for call:    Waiting for colonoscopy order from Dr. Garrison for the patient.  Patient scheduled the colonoscopy for 1/22/24.      Fax # [phone]     Pharmacy location:     [id number] - [place], [place] - [address] AT [facility] [address]  Outside Imaging: n/a   Can we leave a detailed message on this number?  YES     12/27/2023, 8:39 AM

## 2024-01-22 ENCOUNTER — TRANSFERRED RECORDS (OUTPATIENT)
Dept: HEALTH INFORMATION MANAGEMENT | Facility: CLINIC | Age: 59
End: 2024-01-22
Payer: COMMERCIAL

## 2024-01-26 ENCOUNTER — LAB (OUTPATIENT)
Dept: LAB | Facility: CLINIC | Age: 59
End: 2024-01-26
Payer: COMMERCIAL

## 2024-01-26 DIAGNOSIS — I82.4Y1 ACUTE DEEP VEIN THROMBOSIS (DVT) OF PROXIMAL VEIN OF RIGHT LOWER EXTREMITY (H): ICD-10-CM

## 2024-01-26 DIAGNOSIS — Z86.718 H/O DEEP VENOUS THROMBOSIS: ICD-10-CM

## 2024-01-26 PROCEDURE — 36415 COLL VENOUS BLD VENIPUNCTURE: CPT

## 2024-01-26 PROCEDURE — 85379 FIBRIN DEGRADATION QUANT: CPT

## 2024-01-27 LAB — D DIMER PPP FEU-MCNC: 0.78 UG/ML FEU (ref 0–0.5)

## 2024-01-29 ENCOUNTER — VIRTUAL VISIT (OUTPATIENT)
Dept: OTHER | Facility: CLINIC | Age: 59
End: 2024-01-29
Attending: INTERNAL MEDICINE
Payer: COMMERCIAL

## 2024-01-29 DIAGNOSIS — I82.4Y1 ACUTE DEEP VEIN THROMBOSIS (DVT) OF PROXIMAL VEIN OF RIGHT LOWER EXTREMITY (H): Primary | ICD-10-CM

## 2024-01-29 PROCEDURE — 99443 PR PHYSICIAN TELEPHONE EVALUATION 21-30 MIN: CPT | Performed by: INTERNAL MEDICINE

## 2024-01-29 NOTE — PROGRESS NOTES
[facility]  VASCULAR MEDICINE FOLLOW-UP VISIT        PRIMARY HEALTH CARE PROVIDER:  Ede Joseph     REASON FOR VISIT:  6 month follow-up RLE DVT.           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   -Doing well on Xarelto. D dimer has normalized. Venous duplex reveals unchanged nonocclusive thrombus within the right popliteal vein. Unchanged scarring versus adherent wall thrombus is noted within the mid right femoral vein. No popliteal cyst. 3.4 x 2.3 x 0.8 cm lymph node along the right groin is nonspecific and may be reactive.   -Wearing thigh high compression socks.   -We checked LDH given LA, and it was normal.  -On 10/17/23, given d dimer normality and no change in chronic thrombosis we had him stop Xarelto in early November after a trip to [place]. We did check a d dimer one month later on 12/15/2023 to insure it is safe to remain off of AC. Those results revealed d dimer had increased to 0.70. We kept him off of AC as the d dimer was not increasing exponentially. D dimer was rechecked off of AC on 1/26/24 and was slightly higher again at 0.78.   -He was due for a colonoscopy due to prior polyps and had this repeated on 01/22/24. 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 .     Recommendations:   -Continue to wear compression socks (20-30 mmHg thigh high) during the day. He should wear these for the next several years to prevent post-thrombotic syndrome in the future.   -No hypercoagulable work-up is needed at this time as this is his first lifetime VTE event, he has no family history of clotting, and this appears to be provoked.   -Activity as tolerated.   -I do not like that his d dimer has trended up off of AC, albeit only  slightly. It is not increasing enough to mandate resumption of AC presently.  He is travelling to [place] for work 2/18 to 2/25. He is instructed to use Xarelto the day before, the day of, and the day after travelling in each direction. Repeat d dimer one month later (03/25/24)  to track trajectory of d dimer. RTC one day after dimer. If goes up dramatically in the next month, then resume AC at next visit. Attempt to get colonoscopy done in the next month. RTC for BP check as well in the next month.      Junito Corona MD    The longitudinal care of plan for Gualberto and the above diagnosis was addressed during this visit. Due to added complexity of care, we will continue to support Gualberto  and the subsequent management of this condition and with ongoing continuity of care for this condition.       21 minutes total medical care on today's date.    MD location: office  Pt location: home    Phone call start: 16:40  Phone call end: 17:01          HPI: Gualberto Smith is a very pleasant 58 year old male who presented to the ER on 9/26/22 with right lower extremity swelling and pain. Venous duplex was notable for non-occlusive DVT in the right common femoral vein, which then became occlusive in the mid superficial femoral vein and extended through the calf veins. He was admitted and started on IV heparin. IR consulted on him and no thrombectomy was indicated. He was ultimately transitioned to Xarelto and discharged.      He is doing well. He is tolerating Xarelto. He has been wearing compression socks regularly. He was started on Amlodipine in the hospital for hypertension. Blood pressure is now slightly elevated. He has completed 6 months of anticoagulation. D dimer has normalized. Unfortunately, he got confused about his ultrasound appointment time and missed it. He now had his ultrasound completed and presents to discuss results.      This was his first lifetime thromboembolic event. No family history of blood  clots. He was told that his father had thick blood and he would get frequent lab draws. He was very sedentary for about one week in 8/2022 due to back pain. It was at this time that he actually thinks he had COVID for a second time as he and his wife were sick and he slept for 20 out of 24 hours a day. He also went on a long car ride in September to [place] and back, but his symptoms had started before his trip. PSA is normal. He is due for a colonoscopy due to prior polyps.        On 10/17/23, given d dimer normality and no change in chronic thrombosis we had him stop Xarelto in early November after a trip to [place]. We did check a d dimer one month later on 12/15/2023 to insure it is safe to remain off of AC. Those results revealed d dimer had increased to 0.70     PAST MEDICAL HISTORY  Past Medical History           Past Medical History:   Diagnosis Date    NO ACTIVE PROBLEMS              PAST SURGICAL HISTORY:  Past Surgical History             Past Surgical History:   Procedure Laterality Date    NO HISTORY OF SURGERY                   CURRENT MEDICATIONS  acetaminophen (TYLENOL) 325 MG tablet, Take 2 tablets (650 mg) by mouth every 6 hours as needed for mild pain or other (and adjunct with moderate or severe pain or per patient request)  acyclovir (ZOVIRAX) 400 MG tablet, TAKE 1 TABLET BY MOUTH THREE TIMES DAILY for 5 days as needed for cold sore.  allopurinol (ZYLOPRIM) 300 MG tablet, Take 1 tablet (300 mg) by mouth daily  amLODIPine (NORVASC) 10 MG tablet, Take 1 tablet (10 mg) by mouth daily  DULoxetine (CYMBALTA) 20 MG capsule, Take 1 capsule (20 mg) by mouth daily  finasteride (PROPECIA) 1 MG tablet, Take 1 mg by mouth daily Keeps brand, for hair growth  multivitamin w/minerals (THERA-VIT-M) tablet, Take 1 tablet by mouth daily  Omega-3 Fatty Acids (FISH OIL PO), Take 1 capsule by mouth daily Unspecified capsule strength  sildenafil (VIAGRA) 50 MG tablet, Take 1 tablet (50 mg) by mouth daily as needed  "(erectile dysfunction) 30-60 minutes before intercourse  topiramate (TOPAMAX) 25 MG tablet, Take 1 tablet (25 mg) by mouth 2 times daily as needed (nerve pain)  VITAMIN E PO, Take 1 capsule by mouth daily Unspecified capsule strength     No current facility-administered medications on file prior to visit.        ALLERGIES             Allergies   Allergen Reactions    Gabapentin Rash         FAMILY HISTORY  Family History             Family History   Problem Relation Age of Onset    Cancer Mother           lung,brain    Cerebrovascular Disease Father           70's    Hypertension Father      Respiratory Father      Heart Disease Maternal Grandfather      Cancer Sister           two of his sisters diagnosed with skin cancer-melanoma            SOCIAL HISTORY  Social History                Socioeconomic History    Marital status:        Spouse name: Not on file    Number of children: 2    Years of education: Not on file    Highest education level: Not on file   Occupational History    Occupation: Business       Employer: [employer]       Employer: [employer]   Tobacco Use    Smoking status: Never    Smokeless tobacco: Never   Vaping Use    Vaping status: Not on file   Substance and Sexual Activity    Alcohol use: Yes       Comment: weekends    Drug use: No    Sexual activity: Yes       Partners: Female   Other Topics Concern    Parent/sibling w/ CABG, MI or angioplasty before 65F 55M? No   Social History Narrative     , 2 children                        ROS:   Complete ROS negative other than what is stated in HPI.      EXAM:     BP (!) 160/88 (BP Location: Right arm, Patient Position: Chair, Cuff Size: Adult Regular)   Pulse 68   Ht 6' 4\" (1.93 m)   Wt 237 lb 9.6 oz (107.8 kg)   SpO2 96%   BMI 28.92 kg/m        HEENT: NC/AT.  PERRLA.  EOMI.  Sclerae white, not injected.    Heart: RRR. Normal S1, S2 splits physiologically. No murmur, rub, click, or gallop.   Lungs: CTA.  No " ronchi, wheezes, rales.    Abdomen: Soft, nontender, nondistended.   Extremities: No clubbing, cyanosis, or edema. Compression socks are on.  No wounds.      Labs:  LIPID RESULTS:            Lab Results   Component Value Date     CHOL 240 (H) 03/11/2022     CHOL 217 (H) 05/21/2021     HDL 44 03/11/2022     HDL 44 05/21/2021      (H) 03/11/2022      (H) 05/21/2021     TRIG 115 03/11/2022     TRIG 144 05/21/2021     CHOLHDLRATIO 4.6 10/27/2014         LIVER ENZYME RESULTS:            Lab Results   Component Value Date     AST 15 08/25/2022     AST 24 05/21/2021     ALT 39 08/25/2022     ALT 52 05/21/2021         CBC RESULTS:            Lab Results   Component Value Date     WBC 7.3 09/27/2022     WBC 6.3 05/21/2021     RBC 5.13 09/27/2022     RBC 5.32 05/21/2021     HGB 14.2 09/27/2022     HGB 14.4 05/21/2021     HCT 44.0 09/27/2022     HCT 44.0 05/21/2021     MCV 86 09/27/2022     MCV 83 05/21/2021     MCH 27.7 09/27/2022     MCH 27.1 05/21/2021     MCHC 32.3 09/27/2022     MCHC 32.7 05/21/2021     RDW 12.9 09/27/2022     RDW 12.8 05/21/2021      09/27/2022      05/21/2021         BMP RESULTS:            Lab Results   Component Value Date      09/27/2022      05/21/2021     POTASSIUM 3.9 09/27/2022     POTASSIUM 4.2 05/21/2021     CHLORIDE 103 09/27/2022     CHLORIDE 107 05/21/2021     CO2 29 09/27/2022     CO2 28 05/21/2021     ANIONGAP 7 09/27/2022     ANIONGAP 5 05/21/2021      (H) 09/27/2022     GLC 97 05/21/2021     BUN 11 09/27/2022     BUN 17 05/21/2021     CR 0.88 09/27/2022     CR 1.09 05/21/2021     GFRESTIMATED >90 09/27/2022     GFRESTIMATED 75 05/21/2021     GFRESTBLACK 87 05/21/2021     PRAVEEN 9.2 09/27/2022     PRAVEEN 9.4 05/21/2021         A1C RESULTS:            Lab Results   Component Value Date     A1C 5.6 05/21/2021         THYROID RESULTS:            Lab Results   Component Value Date     TSH 2.89 09/09/2022         Component      Latest Ref Rng  4/17/2020  5:41 PM 4/28/2023  4:37 PM   D-Dimer      0.0 - 0.50 ug/ml FEU 0.3      D-Dimer Quantitative      0.00 - 0.50 ug/mL FEU   0.29               Procedures:   VENOUS ULTRASOUND RIGHT LEG  5/9/2023 4:17 PM      HISTORY: 6 month follow-up DVT; Acute deep vein thrombosis (DVT) of  proximal vein of right lower extremity (H)     COMPARISON: None.     FINDINGS:  Examination of the deep veins with graded compression and  color flow Doppler with spectral wave form analysis was performed.   There is been significant interval reduction in DVT in the right lower  extremity when compared to the prior exam.     There is scarring versus wall adherent thrombus on the wall in the mid  right superficial femoral vein. There is chronic nonocclusive thrombus  in the right popliteal vein. The remaining deep veins of the right  lower extremity are patent.                                                                      IMPRESSION: Significant interval improvement in DVT as above.        EXAM: US LOWER EXTREMITY VENOUS DUPLEX RIGHT  LOCATION: [facility]  DATE/TIME: 9/26/2022 8:23 PM     INDICATION: leg swelling  COMPARISON: None.  TECHNIQUE: Venous Duplex ultrasound of the right lower extremity with and without compression, augmentation and duplex. Color flow and spectral Doppler with waveform analysis performed.     FINDINGS: Exam includes the common femoral, femoral, popliteal, and contralateral common femoral veins as well as segmentally visualized deep calf veins and greater saphenous vein.      RIGHT: Nonocclusive clot identified in the right common femoral vein which becomes occlusive in the mid superficial femoral vein extending into the posterior tibial vein, peroneal vein, and gastrocnemius veins. The external iliac vein appears to be   patent. No superficial thrombophlebitis. No popliteal cyst.                                                                      IMPRESSION:  1.  Extensive DVT  identified within the right lower extremity.     2.  These findings were discussed with Dr. Baird at the time of this dictation.        Narrative & Impression   EXAM: US LOWER EXTREMITY VENOUS DUPLEX RIGHT  LOCATION: [facility]  DATE: 9/29/2023     INDICATION: Follow up RLE DVT  COMPARISON: 05/09/2023  TECHNIQUE: Venous Duplex ultrasound of the right lower extremity with and without compression, augmentation and duplex. Color flow and spectral Doppler with waveform analysis performed.     FINDINGS: Exam includes the common femoral, femoral, popliteal, and contralateral common femoral veins as well as segmentally visualized deep calf veins and greater saphenous vein.      RIGHT: Unchanged nonocclusive thrombus within the right popliteal vein. Unchanged scarring versus adherent wall thrombus within the mid right femoral vein. No popliteal cyst. 3.4 x 2.3 x 0.8 cm lymph node along the right groin is nonspecific and may be   reactive.                                                                      IMPRESSION:  1.  No significant change in appearance of deep venous thrombosis in the right lower extremity.      Component      Latest Ref Rng 4/28/2023  4:37 PM 9/22/2023  5:16 PM 12/15/2023  3:40 PM   D-Dimer Quantitative      0.00 - 0.50 ug/mL FEU 0.29  0.34  0.70 (H)       Legend:  (H) High

## 2024-01-29 NOTE — PROGRESS NOTES
Gualberto is a 58 year old who is being evaluated via a billable telephone visit.      What phone number would you like to be contacted at? [phone]  How would you like to obtain your AVS? Chris    Distant Location (provider location):  On-site      Objective           Vitals:  No vitals were obtained today due to virtual visit.      LEIGHANN DELACRUZ

## 2024-03-05 DIAGNOSIS — F41.1 GENERALIZED ANXIETY DISORDER: ICD-10-CM

## 2024-03-05 DIAGNOSIS — G62.9 NEUROPATHY: ICD-10-CM

## 2024-03-05 RX ORDER — DULOXETIN HYDROCHLORIDE 20 MG/1
20 CAPSULE, DELAYED RELEASE ORAL DAILY
Qty: 90 CAPSULE | Refills: 3 | Status: SHIPPED | OUTPATIENT
Start: 2024-03-05

## 2024-03-25 ENCOUNTER — LAB (OUTPATIENT)
Dept: LAB | Facility: CLINIC | Age: 59
End: 2024-03-25
Payer: COMMERCIAL

## 2024-03-25 DIAGNOSIS — I82.4Y1 ACUTE DEEP VEIN THROMBOSIS (DVT) OF PROXIMAL VEIN OF RIGHT LOWER EXTREMITY (H): ICD-10-CM

## 2024-03-25 LAB — D DIMER PPP FEU-MCNC: 0.48 UG/ML FEU (ref 0–0.5)

## 2024-03-25 PROCEDURE — 85379 FIBRIN DEGRADATION QUANT: CPT

## 2024-03-25 PROCEDURE — 36415 COLL VENOUS BLD VENIPUNCTURE: CPT

## 2024-03-28 ENCOUNTER — OFFICE VISIT (OUTPATIENT)
Dept: OTHER | Facility: CLINIC | Age: 59
End: 2024-03-28
Attending: INTERNAL MEDICINE
Payer: COMMERCIAL

## 2024-03-28 VITALS
BODY MASS INDEX: 28.69 KG/M2 | HEART RATE: 64 BPM | WEIGHT: 235.6 LBS | SYSTOLIC BLOOD PRESSURE: 144 MMHG | HEIGHT: 76 IN | OXYGEN SATURATION: 97 % | DIASTOLIC BLOOD PRESSURE: 87 MMHG

## 2024-03-28 DIAGNOSIS — I82.4Y1 ACUTE DEEP VEIN THROMBOSIS (DVT) OF PROXIMAL VEIN OF RIGHT LOWER EXTREMITY (H): ICD-10-CM

## 2024-03-28 PROCEDURE — 99214 OFFICE O/P EST MOD 30 MIN: CPT | Performed by: INTERNAL MEDICINE

## 2024-03-28 PROCEDURE — 99213 OFFICE O/P EST LOW 20 MIN: CPT | Performed by: INTERNAL MEDICINE

## 2024-03-28 NOTE — PROGRESS NOTES
"Patient is here to discuss follow up    BP (!) 144/87 (BP Location: Right arm, Patient Position: Chair, Cuff Size: Adult Large)   Pulse 64   Ht 6' 4\" (1.93 m)   Wt 235 lb 9.6 oz (106.9 kg)   SpO2 97%   BMI 28.68 kg/m      Questions patient would like addressed today are: N/A.    Refills are needed: No    Has homecare services and agency name:  Nevin DELACRUZ    "

## 2024-03-28 NOTE — PROGRESS NOTES
VASCULAR MEDICINE FOLLOW UP VISIT              [facility]  VASCULAR MEDICINE FOLLOW-UP VISIT        PRIMARY HEALTH CARE PROVIDER:  Ede Joseph     REASON FOR VISIT:  6 month follow-up RLE DVT.               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   -Doing well on Xarelto. D dimer has normalized. Venous duplex reveals unchanged nonocclusive thrombus within the right popliteal vein. Unchanged scarring versus adherent wall thrombus is noted within the mid right femoral vein. No popliteal cyst. 3.4 x 2.3 x 0.8 cm lymph node along the right groin is nonspecific and may be reactive.   -Wearing thigh high compression socks.   -We checked LDH given LA, and it was normal.  -On 10/17/23, given d dimer normality and no change in chronic thrombosis we had him stop Xarelto in early November after a trip to [place]. We did check a d dimer one month later on 12/15/2023 to insure it is safe to remain off of AC. Those results revealed d dimer had increased to 0.70. We kept him off of AC as the d dimer was not increasing exponentially. D dimer was rechecked off of AC on 1/26/24 and was slightly higher again at 0.78.   -He was due for a colonoscopy due to prior polyps and had this repeated on 01/22/24. 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   -I did not like that his d dimer had trended up off of AC, albeit only slightly. It was not increasing enough to mandate resumption of AC when seen on 1/29/2024.  He was travelling to [place] for work 2/18 to 2/25. He was instructed to use Xarelto the day before, the day of, and the day after travelling in each direction. Repeat d dimer one month later (03/25/24)  to track trajectory of d dimer revealed it had  decreased form 0.78 to 0.48.       Recommendations:     -Continue to wear compression socks (20-30 mmHg thigh high) during the day. He should wear these for the next several years to prevent post-thrombotic syndrome in the future.   -No hypercoagulable work-up is needed at this time as this is his first lifetime VTE event, he has no family history of clotting, and this appears to be provoked.   -Activity as tolerated.   -Remain off of AC (except when flying as below), stop monitoring d dimer, RTC prn recurrence (which is now an acceptably low risk, and lower than the risk of serious bleeding with indefinite AC).   -He will now for his job need to fly to [place] once monthly for a week at a time. He should use Xarelto 20 mg daily the day before, day of, and day after flying transoceanically. He should not use diuretics such as caffeine or alcohol when flying.      Junito Corona MD     The longitudinal care of plan for Gualberto and the above diagnosis was addressed during this visit. Due to added complexity of care, we will continue to support Gualberto  and the subsequent management of this condition and with ongoing continuity of care for this condition.         3u8 minutes total medical care on today's date.                HPI: Gualberto Smith is a very pleasant 58 year old male who presented to the ER on 9/26/22 with right lower extremity swelling and pain. Venous duplex was notable for non-occlusive DVT in the right common femoral vein, which then became occlusive in the mid superficial femoral vein and extended through the calf veins. He was admitted and started on IV heparin. IR consulted on him and no thrombectomy was indicated. He was ultimately transitioned to Xarelto and discharged.      He is doing well. He is tolerating Xarelto. He has been wearing compression socks regularly. He was started on Amlodipine in the hospital for hypertension. Blood pressure is now slightly elevated. He has completed 6 months  of anticoagulation. D dimer has normalized. Unfortunately, he got confused about his ultrasound appointment time and missed it. He now had his ultrasound completed and presents to discuss results.      This was his first lifetime thromboembolic event. No family history of blood clots. He was told that his father had thick blood and he would get frequent lab draws. He was very sedentary for about one week in 8/2022 due to back pain. It was at this time that he actually thinks he had COVID for a second time as he and his wife were sick and he slept for 20 out of 24 hours a day. He also went on a long car ride in September to [place] and back, but his symptoms had started before his trip. PSA is normal. He is due for a colonoscopy due to prior polyps.        On 10/17/23, given d dimer normality and no change in chronic thrombosis we had him stop Xarelto in early November after a trip to [place]. We did check a d dimer one month later on 12/15/2023 to insure it is safe to remain off of AC. Those results revealed d dimer had increased to 0.70     PAST MEDICAL HISTORY  Past Medical History           Past Medical History:   Diagnosis Date    NO ACTIVE PROBLEMS              PAST SURGICAL HISTORY:  Past Surgical History             Past Surgical History:   Procedure Laterality Date    NO HISTORY OF SURGERY                   CURRENT MEDICATIONS  acetaminophen (TYLENOL) 325 MG tablet, Take 2 tablets (650 mg) by mouth every 6 hours as needed for mild pain or other (and adjunct with moderate or severe pain or per patient request)  acyclovir (ZOVIRAX) 400 MG tablet, TAKE 1 TABLET BY MOUTH THREE TIMES DAILY for 5 days as needed for cold sore.  allopurinol (ZYLOPRIM) 300 MG tablet, Take 1 tablet (300 mg) by mouth daily  amLODIPine (NORVASC) 10 MG tablet, Take 1 tablet (10 mg) by mouth daily  DULoxetine (CYMBALTA) 20 MG capsule, Take 1 capsule (20 mg) by mouth daily  finasteride (PROPECIA) 1 MG tablet, Take 1 mg by mouth daily Keeps  brand, for hair growth  multivitamin w/minerals (THERA-VIT-M) tablet, Take 1 tablet by mouth daily  Omega-3 Fatty Acids (FISH OIL PO), Take 1 capsule by mouth daily Unspecified capsule strength  sildenafil (VIAGRA) 50 MG tablet, Take 1 tablet (50 mg) by mouth daily as needed (erectile dysfunction) 30-60 minutes before intercourse  topiramate (TOPAMAX) 25 MG tablet, Take 1 tablet (25 mg) by mouth 2 times daily as needed (nerve pain)  VITAMIN E PO, Take 1 capsule by mouth daily Unspecified capsule strength     No current facility-administered medications on file prior to visit.        ALLERGIES             Allergies   Allergen Reactions    Gabapentin Rash         FAMILY HISTORY  Family History             Family History   Problem Relation Age of Onset    Cancer Mother           lung,brain    Cerebrovascular Disease Father           70's    Hypertension Father      Respiratory Father      Heart Disease Maternal Grandfather      Cancer Sister           two of his sisters diagnosed with skin cancer-melanoma            SOCIAL HISTORY  Social History                Socioeconomic History    Marital status:        Spouse name: Not on file    Number of children: 2    Years of education: Not on file    Highest education level: Not on file   Occupational History    Occupation: Business       Employer: [employer]       Employer: [employer]   Tobacco Use    Smoking status: Never    Smokeless tobacco: Never   Vaping Use    Vaping status: Not on file   Substance and Sexual Activity    Alcohol use: Yes       Comment: weekends    Drug use: No    Sexual activity: Yes       Partners: Female   Other Topics Concern    Parent/sibling w/ CABG, MI or angioplasty before 65F 55M? No   Social History Narrative     , 2 children                        ROS:   Complete ROS negative other than what is stated in HPI.      EXAM:        Physical exam Reveals:    O/P: WNL  HEENT: WNL  NECK: No JVD, thyromegaly, or  lymphadenopathy  HEART: RRR, no murmurs, gallops, or rubs  LUNGS: CTA bilaterally without rales, wheezes, or rhonchi  GI: NABS, nondistended, nontender, soft  EXT:without cyanosis, clubbing, or edema  NEURO: nonfocal  : no flank tenderness       Labs:  LIPID RESULTS:            Lab Results   Component Value Date     CHOL 240 (H) 03/11/2022     CHOL 217 (H) 05/21/2021     HDL 44 03/11/2022     HDL 44 05/21/2021      (H) 03/11/2022      (H) 05/21/2021     TRIG 115 03/11/2022     TRIG 144 05/21/2021     CHOLHDLRATIO 4.6 10/27/2014         LIVER ENZYME RESULTS:            Lab Results   Component Value Date     AST 15 08/25/2022     AST 24 05/21/2021     ALT 39 08/25/2022     ALT 52 05/21/2021         CBC RESULTS:            Lab Results   Component Value Date     WBC 7.3 09/27/2022     WBC 6.3 05/21/2021     RBC 5.13 09/27/2022     RBC 5.32 05/21/2021     HGB 14.2 09/27/2022     HGB 14.4 05/21/2021     HCT 44.0 09/27/2022     HCT 44.0 05/21/2021     MCV 86 09/27/2022     MCV 83 05/21/2021     MCH 27.7 09/27/2022     MCH 27.1 05/21/2021     MCHC 32.3 09/27/2022     MCHC 32.7 05/21/2021     RDW 12.9 09/27/2022     RDW 12.8 05/21/2021      09/27/2022      05/21/2021         BMP RESULTS:            Lab Results   Component Value Date      09/27/2022      05/21/2021     POTASSIUM 3.9 09/27/2022     POTASSIUM 4.2 05/21/2021     CHLORIDE 103 09/27/2022     CHLORIDE 107 05/21/2021     CO2 29 09/27/2022     CO2 28 05/21/2021     ANIONGAP 7 09/27/2022     ANIONGAP 5 05/21/2021      (H) 09/27/2022     GLC 97 05/21/2021     BUN 11 09/27/2022     BUN 17 05/21/2021     CR 0.88 09/27/2022     CR 1.09 05/21/2021     GFRESTIMATED >90 09/27/2022     GFRESTIMATED 75 05/21/2021     GFRESTBLACK 87 05/21/2021     PRAVEEN 9.2 09/27/2022     PRAVEEN 9.4 05/21/2021         A1C RESULTS:            Lab Results   Component Value Date     A1C 5.6 05/21/2021         THYROID RESULTS:            Lab Results    Component Value Date     TSH 2.89 09/09/2022         Component      Latest Ref Rng 4/17/2020  5:41 PM 4/28/2023  4:37 PM   D-Dimer      0.0 - 0.50 ug/ml FEU 0.3      D-Dimer Quantitative      0.00 - 0.50 ug/mL FEU   0.29               Procedures:   VENOUS ULTRASOUND RIGHT LEG  5/9/2023 4:17 PM      HISTORY: 6 month follow-up DVT; Acute deep vein thrombosis (DVT) of  proximal vein of right lower extremity (H)     COMPARISON: None.     FINDINGS:  Examination of the deep veins with graded compression and  color flow Doppler with spectral wave form analysis was performed.   There is been significant interval reduction in DVT in the right lower  extremity when compared to the prior exam.     There is scarring versus wall adherent thrombus on the wall in the mid  right superficial femoral vein. There is chronic nonocclusive thrombus  in the right popliteal vein. The remaining deep veins of the right  lower extremity are patent.                                                                      IMPRESSION: Significant interval improvement in DVT as above.        EXAM: US LOWER EXTREMITY VENOUS DUPLEX RIGHT  LOCATION: [facility]  DATE/TIME: 9/26/2022 8:23 PM     INDICATION: leg swelling  COMPARISON: None.  TECHNIQUE: Venous Duplex ultrasound of the right lower extremity with and without compression, augmentation and duplex. Color flow and spectral Doppler with waveform analysis performed.     FINDINGS: Exam includes the common femoral, femoral, popliteal, and contralateral common femoral veins as well as segmentally visualized deep calf veins and greater saphenous vein.      RIGHT: Nonocclusive clot identified in the right common femoral vein which becomes occlusive in the mid superficial femoral vein extending into the posterior tibial vein, peroneal vein, and gastrocnemius veins. The external iliac vein appears to be   patent. No superficial thrombophlebitis. No popliteal cyst.                                                                       IMPRESSION:  1.  Extensive DVT identified within the right lower extremity.     2.  These findings were discussed with Dr. Baird at the time of this dictation.        Narrative & Impression   EXAM: US LOWER EXTREMITY VENOUS DUPLEX RIGHT  LOCATION: [facility]  DATE: 9/29/2023     INDICATION: Follow up RLE DVT  COMPARISON: 05/09/2023  TECHNIQUE: Venous Duplex ultrasound of the right lower extremity with and without compression, augmentation and duplex. Color flow and spectral Doppler with waveform analysis performed.     FINDINGS: Exam includes the common femoral, femoral, popliteal, and contralateral common femoral veins as well as segmentally visualized deep calf veins and greater saphenous vein.      RIGHT: Unchanged nonocclusive thrombus within the right popliteal vein. Unchanged scarring versus adherent wall thrombus within the mid right femoral vein. No popliteal cyst. 3.4 x 2.3 x 0.8 cm lymph node along the right groin is nonspecific and may be   reactive.                                                                      IMPRESSION:  1.  No significant change in appearance of deep venous thrombosis in the right lower extremity.      Component      Latest Ref Rng 4/28/2023  4:37 PM 9/22/2023  5:16 PM 12/15/2023  3:40 PM   D-Dimer Quantitative      0.00 - 0.50 ug/mL FEU 0.29  0.34  0.70 (H)       Legend:  (H) High

## 2024-04-23 ENCOUNTER — TRANSFERRED RECORDS (OUTPATIENT)
Dept: HEALTH INFORMATION MANAGEMENT | Facility: CLINIC | Age: 59
End: 2024-04-23
Payer: COMMERCIAL

## 2024-07-19 ENCOUNTER — OFFICE VISIT (OUTPATIENT)
Dept: URGENT CARE | Facility: URGENT CARE | Age: 59
End: 2024-07-19
Payer: COMMERCIAL

## 2024-07-19 VITALS
WEIGHT: 232 LBS | SYSTOLIC BLOOD PRESSURE: 124 MMHG | DIASTOLIC BLOOD PRESSURE: 79 MMHG | OXYGEN SATURATION: 96 % | HEART RATE: 64 BPM | TEMPERATURE: 97.5 F | BODY MASS INDEX: 28.24 KG/M2 | RESPIRATION RATE: 16 BRPM

## 2024-07-19 DIAGNOSIS — L03.312 CELLULITIS OF UPPER BACK EXCLUDING SCAPULAR REGION: Primary | ICD-10-CM

## 2024-07-19 PROCEDURE — 99213 OFFICE O/P EST LOW 20 MIN: CPT | Performed by: FAMILY MEDICINE

## 2024-07-19 RX ORDER — CEPHALEXIN 500 MG/1
500 CAPSULE ORAL 3 TIMES DAILY
Qty: 21 CAPSULE | Refills: 0 | Status: SHIPPED | OUTPATIENT
Start: 2024-07-19 | End: 2024-07-26

## 2024-07-19 NOTE — PATIENT INSTRUCTIONS
Place warmth over the red tender area on the left upper back for 10 minutes at a time, every 2 hours while awake.      Follow up if you notice an enlarging red area at the left upper back, if there is a spreading red streak extending from the lesion, if you experience of fevers of 100.4 F or higher.      Follow up if not better in 4-5 days.

## 2024-07-19 NOTE — PROGRESS NOTES
SUBJECTIVE:   Gualberto Smith is a 59 year old male presenting with a chief complaint of one week of redness, tenderness at the left upper back (medial shoulder blade area).  Onset of symptoms was one week ago.  Course of illness is worsening and now shrinking and about the same over the past few days.  .    Current and Associated symptoms: there was some pus and blood oozing out of the wound today.  (His wife noticed this today.).    Treatment measures tried include none.  .  Predisposing factors include no history of recurrent wound infections.   No recent cuts/scratches.    Past Medical History:   Diagnosis Date    NO ACTIVE PROBLEMS      Current Outpatient Medications   Medication Sig Dispense Refill    acetaminophen (TYLENOL) 325 MG tablet Take 2 tablets (650 mg) by mouth every 6 hours as needed for mild pain or other (and adjunct with moderate or severe pain or per patient request)      acyclovir (ZOVIRAX) 400 MG tablet TAKE 1 TABLET BY MOUTH THREE TIMES DAILY for 5 days as needed for cold sore. 20 tablet 1    amLODIPine (NORVASC) 10 MG tablet Take 1 tablet (10 mg) by mouth daily 90 tablet 3    DULoxetine (CYMBALTA) 20 MG capsule TAKE 1 CAPSULE(20 MG) BY MOUTH DAILY 90 capsule 3    finasteride (PROPECIA) 1 MG tablet Take 1 mg by mouth daily Keeps brand, for hair growth      multivitamin w/minerals (THERA-VIT-M) tablet Take 1 tablet by mouth daily 100 tablet 3    Omega-3 Fatty Acids (FISH OIL PO) Take 1 capsule by mouth daily Unspecified capsule strength      sildenafil (VIAGRA) 50 MG tablet Take 1 tablet (50 mg) by mouth daily as needed (erectile dysfunction) 30-60 minutes before intercourse 30 tablet 3    topiramate (TOPAMAX) 25 MG tablet Take 1 tablet (25 mg) by mouth 2 times daily as needed (nerve pain) 60 tablet 11    VITAMIN E PO Take 1 capsule by mouth daily Unspecified capsule strength      rivaroxaban ANTICOAGULANT (XARELTO) 20 MG TABS tablet Take 20 mg orally daily the day before, the day of, and the  day after transcontinental travel. (Patient not taking: Reported on 7/19/2024) 30 tablet 11     Social History     Tobacco Use    Smoking status: Never    Smokeless tobacco: Never   Substance Use Topics    Alcohol use: Yes     Comment: weekends       ROS:  CONSTITUTIONAL:negative for fevers.   INTEGUMENTARY/SKIN:  positive for redness, tenderness at the left upper back.      OBJECTIVE:  /79   Pulse 64   Temp 97.5  F (36.4  C)   Resp 16   Wt 105.2 kg (232 lb)   SpO2 96%   BMI 28.24 kg/m    GENERAL APPEARANCE: healthy, alert and no distress  SKIN: left upper back has a round, confluent, tender, edematous lesion that is 2.5 cm long and 3 cm wide.  No red streaks are present.  Mild induration is present.  No fluctuance is present.  No pustules/crusting/active hemorrhage.      ASSESSMENT:  Cellulitis at the left upper back region.      PLAN:  Rx:  Cephalexin  See orders in Epic    Place warmth onto the painful red area of the left upper back.    Follow up if not better in 4-5 days  Follow up if you notice an enlarging red area at the left upper back, if there is a spreading red streak extending from the lesion, if you experience of fevers of 100.4 F or higher.        Kalia Bassett MD

## 2024-08-10 ENCOUNTER — HEALTH MAINTENANCE LETTER (OUTPATIENT)
Age: 59
End: 2024-08-10

## 2024-12-02 DIAGNOSIS — I10 ESSENTIAL HYPERTENSION: ICD-10-CM

## 2024-12-03 RX ORDER — AMLODIPINE BESYLATE 10 MG/1
10 TABLET ORAL DAILY
Qty: 90 TABLET | Refills: 3 | Status: SHIPPED | OUTPATIENT
Start: 2024-12-03

## 2024-12-06 ENCOUNTER — MYC REFILL (OUTPATIENT)
Dept: INTERNAL MEDICINE | Facility: CLINIC | Age: 59
End: 2024-12-06
Payer: COMMERCIAL

## 2024-12-06 DIAGNOSIS — I10 ESSENTIAL HYPERTENSION: ICD-10-CM

## 2024-12-09 RX ORDER — AMLODIPINE BESYLATE 10 MG/1
10 TABLET ORAL DAILY
Qty: 90 TABLET | Refills: 3 | OUTPATIENT
Start: 2024-12-09

## 2024-12-14 ENCOUNTER — MYC REFILL (OUTPATIENT)
Dept: INTERNAL MEDICINE | Facility: CLINIC | Age: 59
End: 2024-12-14
Payer: COMMERCIAL

## 2024-12-14 DIAGNOSIS — I10 ESSENTIAL HYPERTENSION: ICD-10-CM

## 2024-12-14 RX ORDER — AMLODIPINE BESYLATE 10 MG/1
10 TABLET ORAL DAILY
Qty: 90 TABLET | Refills: 3 | Status: CANCELLED | OUTPATIENT
Start: 2024-12-14

## 2025-03-05 DIAGNOSIS — F41.1 GENERALIZED ANXIETY DISORDER: ICD-10-CM

## 2025-03-05 DIAGNOSIS — G62.9 NEUROPATHY: ICD-10-CM

## 2025-03-05 RX ORDER — DULOXETIN HYDROCHLORIDE 20 MG/1
20 CAPSULE, DELAYED RELEASE ORAL DAILY
Qty: 90 CAPSULE | Refills: 3 | Status: SHIPPED | OUTPATIENT
Start: 2025-03-05

## 2025-08-16 ENCOUNTER — HEALTH MAINTENANCE LETTER (OUTPATIENT)
Age: 60
End: 2025-08-16

## 2025-08-27 ENCOUNTER — MYC REFILL (OUTPATIENT)
Dept: INTERNAL MEDICINE | Facility: CLINIC | Age: 60
End: 2025-08-27
Payer: COMMERCIAL

## 2025-08-27 DIAGNOSIS — B00.1 RECURRENT COLD SORES: ICD-10-CM

## 2025-08-28 RX ORDER — ACYCLOVIR 400 MG/1
TABLET ORAL
Qty: 20 TABLET | Refills: 1 | Status: SHIPPED | OUTPATIENT
Start: 2025-08-28